# Patient Record
Sex: MALE | HISPANIC OR LATINO | Employment: FULL TIME | URBAN - METROPOLITAN AREA
[De-identification: names, ages, dates, MRNs, and addresses within clinical notes are randomized per-mention and may not be internally consistent; named-entity substitution may affect disease eponyms.]

---

## 2019-04-15 ENCOUNTER — TRANSFERRED RECORDS (OUTPATIENT)
Dept: HEALTH INFORMATION MANAGEMENT | Facility: CLINIC | Age: 29
End: 2019-04-15

## 2019-04-15 LAB
ALT SERPL-CCNC: 33 U/L (ref 0–78)
AST SERPL-CCNC: 19 U/L (ref 0–37)
CREAT SERPL-MCNC: 1.25 MG/DL (ref 0.6–1.3)
GLUCOSE SERPL-MCNC: 90 MG/DL (ref 70–99)
POTASSIUM SERPL-SCNC: 3.8 MMOL/L (ref 3.5–5.1)

## 2019-05-28 ENCOUNTER — NURSE TRIAGE (OUTPATIENT)
Dept: NURSING | Facility: CLINIC | Age: 29
End: 2019-05-28

## 2019-05-29 NOTE — TELEPHONE ENCOUNTER
"Mao calling to make appt with PCP for referral to GI or for colonoscopy.  Since mid April has been having diarrhea, with blood noted at times.  Did have work up previously done since onset of symptoms, including BRAT diet, CT scan, and lab tests, all of which were \"fine\".  Does have 2 small hemmrhoids but reports provider he did see told him they should not account for the amount of blood he is seeing.  Typically having 3-4 stools per day, with blood noted 2-3 times daily.  Blood increases with certain foods including highly fatty foods, greasy foods or carbonated beverages.  With BRAT diet, blood did decrease but did not cease.  Amount of blood varies, sometimes with \"streaks\" and at other times larger amounts with clots.  \"Next step\" per previous provider, was to have a colonoscopy.  Calling to make appt for either GI referral or colonoscopy referral.      Reason for Disposition    [1] Blood in the stool AND [2] small amount of blood   (Exception: only on toilet paper. Reason: diarrhea can cause rectal irritation with blood on wiping)    Additional Information    Negative: Shock suspected (e.g., cold/pale/clammy skin, too weak to stand, low BP, rapid pulse)    Negative: Difficult to awaken or acting confused (e.g., disoriented, slurred speech)    Negative: Sounds like a life-threatening emergency to the triager    Negative: Vomiting also present and worse than the diarrhea    Negative: [1] Blood in stool AND [2] without diarrhea    Negative: Diarrhea in a cancer patient who is currently (or recently) receiving chemotherapy or radiation therapy, or cancer patient who has metastatic or end-stage cancer and is receiving palliative care    Negative: [1] SEVERE abdominal pain (e.g., excruciating) AND [2] present > 1 hour    Negative: [1] SEVERE abdominal pain AND [2] age > 60    Negative: Black or tarry bowel movements  (Exception: chronic-unchanged  black-grey bowel movements AND is taking iron pills or " Pepto-bismol)    Negative: [1] Blood in the stool AND [2] moderate or large amount of blood    Negative: [1] Drinking very little AND [2] dehydration suspected (e.g., no urine > 12 hours, very dry mouth, very lightheaded)    Negative: Patient sounds very sick or weak to the triager    Negative: [1] SEVERE diarrhea (e.g., 7 or more times / day more than normal) AND [2]  age > 60 years    Negative: [1] Constant abdominal pain AND [2] present > 2 hours    Negative: [1] Fever > 103 F (39.4 C) AND [2] not able to get the fever down using Fever Care Advice    Negative: Abdominal pain  (Exception: Pain clears with each passage of diarrhea stool)    Negative: Fever present > 3 days (72 hours)    Negative: Fever > 101 F (38.3 C)    Negative: [1] MODERATE diarrhea (e.g., 4-6 times / day more than normal) AND [2] age > 70 years    Negative: [1] MODERATE diarrhea (e.g., 4-6 times / day more than normal) AND [2] present > 48 hours (2 days)    Negative: [1] SEVERE diarrhea (e.g., 7 or more times / day more than normal) AND [2] present > 24 hours (1 day)    Protocols used: DIARRHEA-A-

## 2019-05-30 ENCOUNTER — OFFICE VISIT (OUTPATIENT)
Dept: FAMILY MEDICINE | Facility: CLINIC | Age: 29
End: 2019-05-30
Payer: COMMERCIAL

## 2019-05-30 ENCOUNTER — TELEPHONE (OUTPATIENT)
Dept: PEDIATRICS | Facility: CLINIC | Age: 29
End: 2019-05-30

## 2019-05-30 VITALS
SYSTOLIC BLOOD PRESSURE: 118 MMHG | BODY MASS INDEX: 28.06 KG/M2 | OXYGEN SATURATION: 97 % | WEIGHT: 196 LBS | HEART RATE: 76 BPM | TEMPERATURE: 98.5 F | HEIGHT: 70 IN | DIASTOLIC BLOOD PRESSURE: 70 MMHG

## 2019-05-30 DIAGNOSIS — Z11.4 SCREENING FOR HIV (HUMAN IMMUNODEFICIENCY VIRUS): ICD-10-CM

## 2019-05-30 DIAGNOSIS — K92.1 HEMATOCHEZIA: Primary | ICD-10-CM

## 2019-05-30 LAB
ANION GAP SERPL CALCULATED.3IONS-SCNC: 7 MMOL/L (ref 3–14)
BASOPHILS # BLD AUTO: 0 10E9/L (ref 0–0.2)
BASOPHILS NFR BLD AUTO: 0.3 %
BUN SERPL-MCNC: 13 MG/DL (ref 7–30)
CALCIUM SERPL-MCNC: 8.8 MG/DL (ref 8.5–10.1)
CHLORIDE SERPL-SCNC: 107 MMOL/L (ref 94–109)
CO2 SERPL-SCNC: 26 MMOL/L (ref 20–32)
CREAT SERPL-MCNC: 0.95 MG/DL (ref 0.66–1.25)
CRP SERPL-MCNC: <2.9 MG/L (ref 0–8)
DIFFERENTIAL METHOD BLD: NORMAL
EOSINOPHIL # BLD AUTO: 0.1 10E9/L (ref 0–0.7)
EOSINOPHIL NFR BLD AUTO: 1.1 %
ERYTHROCYTE [DISTWIDTH] IN BLOOD BY AUTOMATED COUNT: 11.8 % (ref 10–15)
ERYTHROCYTE [SEDIMENTATION RATE] IN BLOOD BY WESTERGREN METHOD: 7 MM/H (ref 0–15)
GFR SERPL CREATININE-BSD FRML MDRD: >90 ML/MIN/{1.73_M2}
GLUCOSE SERPL-MCNC: 74 MG/DL (ref 70–99)
HCT VFR BLD AUTO: 42.8 % (ref 40–53)
HGB BLD-MCNC: 14.3 G/DL (ref 13.3–17.7)
IGA SERPL-MCNC: 217 MG/DL (ref 70–380)
INR PPP: 0.99 (ref 0.86–1.14)
LYMPHOCYTES # BLD AUTO: 2.3 10E9/L (ref 0.8–5.3)
LYMPHOCYTES NFR BLD AUTO: 32.2 %
MCH RBC QN AUTO: 29.7 PG (ref 26.5–33)
MCHC RBC AUTO-ENTMCNC: 33.4 G/DL (ref 31.5–36.5)
MCV RBC AUTO: 89 FL (ref 78–100)
MONOCYTES # BLD AUTO: 0.5 10E9/L (ref 0–1.3)
MONOCYTES NFR BLD AUTO: 7.3 %
NEUTROPHILS # BLD AUTO: 4.3 10E9/L (ref 1.6–8.3)
NEUTROPHILS NFR BLD AUTO: 59.1 %
PLATELET # BLD AUTO: 297 10E9/L (ref 150–450)
POTASSIUM SERPL-SCNC: 3.6 MMOL/L (ref 3.4–5.3)
RBC # BLD AUTO: 4.82 10E12/L (ref 4.4–5.9)
SODIUM SERPL-SCNC: 140 MMOL/L (ref 133–144)
WBC # BLD AUTO: 7.3 10E9/L (ref 4–11)

## 2019-05-30 PROCEDURE — 87329 GIARDIA AG IA: CPT | Performed by: PEDIATRICS

## 2019-05-30 PROCEDURE — 82784 ASSAY IGA/IGD/IGG/IGM EACH: CPT | Performed by: PEDIATRICS

## 2019-05-30 PROCEDURE — 87389 HIV-1 AG W/HIV-1&-2 AB AG IA: CPT | Performed by: PEDIATRICS

## 2019-05-30 PROCEDURE — 83516 IMMUNOASSAY NONANTIBODY: CPT | Mod: 59 | Performed by: PEDIATRICS

## 2019-05-30 PROCEDURE — 85652 RBC SED RATE AUTOMATED: CPT | Performed by: PEDIATRICS

## 2019-05-30 PROCEDURE — 80048 BASIC METABOLIC PNL TOTAL CA: CPT | Performed by: PEDIATRICS

## 2019-05-30 PROCEDURE — 85025 COMPLETE CBC W/AUTO DIFF WBC: CPT | Performed by: PEDIATRICS

## 2019-05-30 PROCEDURE — 99204 OFFICE O/P NEW MOD 45 MIN: CPT | Performed by: PEDIATRICS

## 2019-05-30 PROCEDURE — 87177 OVA AND PARASITES SMEARS: CPT | Performed by: PEDIATRICS

## 2019-05-30 PROCEDURE — 86140 C-REACTIVE PROTEIN: CPT | Performed by: PEDIATRICS

## 2019-05-30 PROCEDURE — 85610 PROTHROMBIN TIME: CPT | Performed by: PEDIATRICS

## 2019-05-30 PROCEDURE — 36415 COLL VENOUS BLD VENIPUNCTURE: CPT | Performed by: PEDIATRICS

## 2019-05-30 PROCEDURE — 87209 SMEAR COMPLEX STAIN: CPT | Performed by: PEDIATRICS

## 2019-05-30 SDOH — HEALTH STABILITY: MENTAL HEALTH: HOW OFTEN DO YOU HAVE 6 OR MORE DRINKS ON ONE OCCASION?: NEVER

## 2019-05-30 SDOH — HEALTH STABILITY: MENTAL HEALTH: HOW MANY STANDARD DRINKS CONTAINING ALCOHOL DO YOU HAVE ON A TYPICAL DAY?: 3 OR 4

## 2019-05-30 SDOH — HEALTH STABILITY: MENTAL HEALTH: HOW OFTEN DO YOU HAVE A DRINK CONTAINING ALCOHOL?: 2-4 TIMES A MONTH

## 2019-05-30 ASSESSMENT — MIFFLIN-ST. JEOR: SCORE: 1856.33

## 2019-05-30 NOTE — TELEPHONE ENCOUNTER
Called pt and would like a call back in one hour. He was at lunch and unable to talk at that time.

## 2019-05-30 NOTE — PROGRESS NOTES
"  SUBJECTIVE:                                                    Mao Patel is a 28 year old male who presents to clinic today for the following health issues:      Diarrhea      Duration: April    Description:       Consistency of stool: watery, runny and loose       Blood in stool: YES       Number of loose stools past 24 hours: 1    Intensity:  moderate    Accompanying signs and symptoms:       Fever: no        Nausea/vomitting: no        Abdominal pain: no        Weight loss: no     History (recent antibiotics or travel/ill contacts/med changes/testing done): [facility]. Pt has reports.    Precipitating or alleviating factors: None    Therapies tried and outcome: None      This is a 27 yo male with 6 weeks of diarrhea, intermittently with blood. He is having 3-4 stools daily with blood approximately 2-3 times. Stools alternate between liquid and more formed. Blood is described as \"streaks\" and other times larger amounts with clots. The blood is mixed in his stool. It is worse with high fat foods, greasy foods or carbonated beverages. It improved with the BRAT diet but did not resolve. He does not have any abdominal pain, but does have urgency. He denies fevers, weight loss, nausea, vomiting. He is otherwise healthy, no personal or family history of autoimmune disease. Of note, the patient did just move from [place] to the  in 1/2019. He did not have any of these symptoms prior to immigrating. He has never give a stool sample. He did take a trip to the [facility] two weeks ago where his symptoms got worse. He did not do any outdoor travel prior to this. 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 No bleeding anywhere else - gums, urine, etc.     The patient was seen and evaluated at [facility] on 4/15/2019. His workup included a CT Abd/pelvis on 4/15 which was unremarkable. Blood tests included. The plan was to try a BRAT diet which did help " "slightly for a couple weeks.      - ESR: 8 (nl 2-10)    - LFTs: alk phos 57, ast 19, alt 33, tbili 0.34, TP 7.7, albumin 4.3    - BMP: normal lytes, cr 1.25    - CBC: wbc 9, hgb 14.4, plt 287    Problem list and histories reviewed & adjusted, as indicated.  Additional history: as documented    There is no problem list on file for this patient.    Past Surgical History:   Procedure Laterality Date     NOSE SURGERY      for deviated septum       Social History     Tobacco Use     Smoking status: Never Smoker     Smokeless tobacco: Never Used   Substance Use Topics     Alcohol use: Yes     Frequency: 2-4 times a month     Drinks per session: 3 or 4     Binge frequency: Never     Family History   Problem Relation Age of Onset     Family History Negative Mother      Other - See Comments Mother         hemorrhoids, required surgery     Other - See Comments Father         cholecystitis     Cancer Maternal Grandfather          No current outpatient medications on file.     No Known Allergies    ROS:  A 10 point ROS is negative other than the symptoms noted above in the HPI.    OBJECTIVE:     /70 (BP Location: Right arm, Patient Position: Chair, Cuff Size: Adult Large)   Pulse 76   Temp 98.5  F (36.9  C) (Oral)   Ht 1.772 m (5' 9.75\")   Wt 88.9 kg (196 lb)   SpO2 97%   BMI 28.33 kg/m    Body mass index is 28.33 kg/m .    Exam:  General: the patient is pleasant, resting comfortably, no acute distress.   HEENT: Normocephalic, atraumatic. TMs and canals are normal. No nasal discharge or deformity. MMM, no oral lesions, age appropriate dentition. No scleral icterus or conjunctivitis, PERRL.   Lungs: Clear to auscultation, no wheezes or crackles.   CV: RRR, nl s1 and s2, no m/r/g.   Abdomen: Soft, nondistended, mild RUQ tenderness to palpation. No rebound or guarding. Bowel sounds active.  Extremities: No lower extremity edema. Peripheral pulses strong and symmetric.   Skin: no appreciable skin lesions/rashes on " exposed skin.   Neuro: Alert and oriented x4, grossly normal neurologic exam.     Diagnostic Test Results:  ESR: 7  CBC: wbc 7.3, hgb 14.3, plt 297    ASSESSMENT/PLAN:     1. Chronic bloody diarrhea   28 yoM recently immigrated from [place] in 1/2019 now presenting with 6-8 weeks of blood diarrhea, 3-4 episodes daily without weight loss, abdominal pain or drop in hemoglobin. Differential includes infectious etiology vs inflammatory bowel disease vs other colitis. CT imaging negative, inflammatory markers negative. Does have hemorrhoids, but blood mixed in with stool makes this seem less likely. Highest concern is some infectious etiology. The patient is completely hemodynamically stable and hemoglobin is within normal limits. I did call and discuss the case with oncall gastroenterologist at [facility], Dr. Mitchell. Will proceed with lab workup as planned and work on getting the patient in for colonoscopy ASAP. Based on results of colonoscopy, may also need formal GI consult.     - CBC with platelets and differential    - CRP, inflammation    - INR    - IgA    - Tissue transglutaminase chelsea IgA and IgG    - Basic metabolic panel  (Ca, Cl, CO2, Creat, Gluc, K, Na, BUN)    - Ova and Parasite Exam Routine; Future    - Giardia antigen; Future    - HIV Antigen Antibody Combo    - ESR: Erythrocyte sedimentation rate    - Enteric Bacteria and Virus Panel by [facility] Stool; Future    - GASTROENTEROLOGY ADULT REF PROCEDURE ONLY    2. Screening for HIV (human immunodeficiency virus)  No risk factors, but has never had this in the past and if immunocompromised would have a different differential for bloody diarrhea.    - HIV Antigen Antibody Combo    Follow up: after colonoscopy, will try to get the patient into resident clinic in the next 2-3 weeks.       Lesli Rodriguez MD  Internal Medicine Pediatrics  [facility]

## 2019-05-30 NOTE — TELEPHONE ENCOUNTER
Please call the patient to schedule a follow up appointment for mid-June.  He would be an excellent patient to see one of the residents at [facility] if he is agreeable to getting care there.

## 2019-05-30 NOTE — PATIENT INSTRUCTIONS
Thank you for coming in to clinic today. It was a pleasure to meet you. I am currently building my patient panel and would be happy to see you back in clinic. I currently see patients on Wednesday afternoons and Thursday mornings.     Lesli Rodriguez MD  Internal Medicine-Pediatrics   For appointments: [phone]      Doctor's Instructions:   For the blood in your stool:     - we will check blood tests today including blood counts, electrolytes, for celiac disease, inflammatory markers, HIV    - I will send you home with a stool collection kit to look for parasites.    - a referral has been made for colonoscopy    Return in 3-4 weeks for recheck, sooner if symptoms are getting worse.

## 2019-05-31 DIAGNOSIS — K92.1 HEMATOCHEZIA: ICD-10-CM

## 2019-05-31 LAB
G LAMBLIA AG STL QL IA: NORMAL
HIV 1+2 AB+HIV1 P24 AG SERPL QL IA: NONREACTIVE
O+P STL MICRO: NORMAL
SPECIMEN SOURCE: NORMAL
SPECIMEN SOURCE: NORMAL
TTG IGA SER-ACNC: 1 U/ML
TTG IGG SER-ACNC: <1 U/ML

## 2019-05-31 PROCEDURE — 87506 IADNA-DNA/RNA PROBE TQ 6-11: CPT | Performed by: PEDIATRICS

## 2019-05-31 NOTE — TELEPHONE ENCOUNTER
Dr Rodriguez  I called pt and he is wondring since he has dropped off specimens yesterday and he will drop off again today. He has an appt for Colonoscopy on June 14th. Do you want to see him right before the procedure. You are going to have some open days at [place] on the 12th and 13th. Would you want to schedule an appt with him during one on those days?  Let me know and I will call him back.

## 2019-06-04 NOTE — TELEPHONE ENCOUNTER
I'd like for him to see one of the residents following his colonoscopy. My last days in clinic are next week.     Thanks!   Lesli

## 2019-06-12 NOTE — PROGRESS NOTES
"Subjective     Mao Patel is a 29 year old male who presents to clinic today for the following health issues:    HPI   Patient is here to follow up from colonoscopy procedure.   He had this scheduled after an extended period of diarrhea and bleeding  Imaging did show proctitis  There is no family hx  Since his last appt he has actually felt well save for the BM changes  He has not picked up Canasa 2/2 price    Patient Active Problem List   Diagnosis     Ulcerative proctitis (H)     Past Surgical History:   Procedure Laterality Date     COLONOSCOPY N/A 6/14/2019    Procedure: COLONOSCOPY, WITH cold biopsy;  Surgeon: Phill Ruiz MD;  Location:  GI     NOSE SURGERY      for deviated septum     ORTHOPEDIC SURGERY         Social History     Tobacco Use     Smoking status: Never Smoker     Smokeless tobacco: Never Used   Substance Use Topics     Alcohol use: Yes     Frequency: 2-4 times a month     Drinks per session: 3 or 4     Binge frequency: Never     Family History   Problem Relation Age of Onset     Family History Negative Mother      Other - See Comments Mother         hemorrhoids, required surgery     Other - See Comments Father         cholecystitis     Cancer Maternal Grandfather      Colon Cancer No family hx of            Reviewed and updated as needed this visit by Provider         Review of Systems   ROS COMP: Constitutional, HEENT, cardiovascular, pulmonary, gi and gu systems are negative, except as otherwise noted.      Objective    /68   Pulse 61   Temp 97.1  F (36.2  C) (Tympanic)   Resp 16   Ht 1.772 m (5' 9.75\")   Wt 90.9 kg (200 lb 8 oz)   SpO2 99%   BMI 28.98 kg/m    Body mass index is 28.98 kg/m .  Physical Exam       Diagnostic Test Results:  Labs and imaging reviewed in Epic        Assessment & Plan     1. Ulcerative proctitis with rectal bleeding (H)  This is a new diagnosis for Mao. We reviewed it in clinic today extensively. Questions answered at length. He is " comfortable with his dx. He has not yet started his medication 2/2 cost so I will send a generic Rx to see if this helps. He needs to schedule with [facility] for further, routine care of his colitis. He'll follow up as needed prior to this with any change in symptoms. Referral has been placed for specialty  - mesalamine (CANASA) 1000 MG suppository; Place 1 suppository (1,000 mg) rectally At Bedtime  Dispense: 30 suppository; Refill: 0  - GASTROENTEROLOGY ADULT REF CONSULT ONLY      No follow-ups on file.    Jeremiah Esparza PA-C  [facility]

## 2019-06-14 ENCOUNTER — HOSPITAL ENCOUNTER (OUTPATIENT)
Facility: CLINIC | Age: 29
Discharge: HOME | End: 2019-06-14
Attending: INTERNAL MEDICINE | Admitting: INTERNAL MEDICINE
Payer: COMMERCIAL

## 2019-06-14 VITALS
RESPIRATION RATE: 20 BRPM | OXYGEN SATURATION: 100 % | HEART RATE: 68 BPM | DIASTOLIC BLOOD PRESSURE: 81 MMHG | SYSTOLIC BLOOD PRESSURE: 117 MMHG

## 2019-06-14 LAB — COLONOSCOPY: NORMAL

## 2019-06-14 PROCEDURE — G0500 MOD SEDAT ENDO SERVICE >5YRS: HCPCS | Performed by: INTERNAL MEDICINE

## 2019-06-14 PROCEDURE — 88305 TISSUE EXAM BY PATHOLOGIST: CPT | Mod: 26 | Performed by: INTERNAL MEDICINE

## 2019-06-14 PROCEDURE — 45380 COLONOSCOPY AND BIOPSY: CPT | Performed by: INTERNAL MEDICINE

## 2019-06-14 PROCEDURE — 88305 TISSUE EXAM BY PATHOLOGIST: CPT | Performed by: INTERNAL MEDICINE

## 2019-06-14 PROCEDURE — 88342 IMHCHEM/IMCYTCHM 1ST ANTB: CPT | Performed by: INTERNAL MEDICINE

## 2019-06-14 PROCEDURE — 88342 IMHCHEM/IMCYTCHM 1ST ANTB: CPT | Mod: 26 | Performed by: INTERNAL MEDICINE

## 2019-06-14 PROCEDURE — 25000128 H RX IP 250 OP 636: Performed by: INTERNAL MEDICINE

## 2019-06-14 RX ORDER — ONDANSETRON 4 MG/1
4 TABLET, ORALLY DISINTEGRATING ORAL EVERY 6 HOURS PRN
Status: DISCONTINUED | OUTPATIENT
Start: 2019-06-14 | End: 2019-06-14 | Stop reason: HOSPADM

## 2019-06-14 RX ORDER — LIDOCAINE 40 MG/G
CREAM TOPICAL
Status: DISCONTINUED | OUTPATIENT
Start: 2019-06-14 | End: 2019-06-14 | Stop reason: HOSPADM

## 2019-06-14 RX ORDER — NALOXONE HYDROCHLORIDE 0.4 MG/ML
.1-.4 INJECTION, SOLUTION INTRAMUSCULAR; INTRAVENOUS; SUBCUTANEOUS
Status: DISCONTINUED | OUTPATIENT
Start: 2019-06-14 | End: 2019-06-14 | Stop reason: HOSPADM

## 2019-06-14 RX ORDER — ONDANSETRON 2 MG/ML
4 INJECTION INTRAMUSCULAR; INTRAVENOUS EVERY 6 HOURS PRN
Status: DISCONTINUED | OUTPATIENT
Start: 2019-06-14 | End: 2019-06-14 | Stop reason: HOSPADM

## 2019-06-14 RX ORDER — FLUMAZENIL 0.1 MG/ML
0.2 INJECTION, SOLUTION INTRAVENOUS
Status: DISCONTINUED | OUTPATIENT
Start: 2019-06-14 | End: 2019-06-14 | Stop reason: HOSPADM

## 2019-06-14 RX ORDER — ONDANSETRON 2 MG/ML
4 INJECTION INTRAMUSCULAR; INTRAVENOUS
Status: DISCONTINUED | OUTPATIENT
Start: 2019-06-14 | End: 2019-06-14 | Stop reason: HOSPADM

## 2019-06-14 RX ORDER — FENTANYL CITRATE 50 UG/ML
INJECTION, SOLUTION INTRAMUSCULAR; INTRAVENOUS PRN
Status: DISCONTINUED | OUTPATIENT
Start: 2019-06-14 | End: 2019-06-14 | Stop reason: HOSPADM

## 2019-06-14 NOTE — H&P
Pre-Endoscopy History and Physical     Mao Patel MRN# [id number]   YOB: 1990 Age: 29 year old     Date of Procedure: 6/14/2019  Primary care provider: Elieser Javier  Type of Endoscopy: Colonoscopy with possible biopsy, possible polypectomy  Reason for Procedure: bleed  Type of Anesthesia Anticipated: Conscious Sedation    HPI:    Mao is a 29 year old male who will be undergoing the above procedure.      A history and physical has been performed. The patient's medications and allergies have been reviewed. The risks and benefits of the procedure and the sedation options and risks were discussed with the patient.  All questions were answered and informed consent was obtained.      He denies a personal or family history of anesthesia complications or bleeding disorders.     There is no problem list on file for this patient.       Past Medical History:   Diagnosis Date     NO ACTIVE PROBLEMS         Past Surgical History:   Procedure Laterality Date     NOSE SURGERY      for deviated septum     ORTHOPEDIC SURGERY         Social History     Tobacco Use     Smoking status: Never Smoker     Smokeless tobacco: Never Used   Substance Use Topics     Alcohol use: Yes     Frequency: 2-4 times a month     Drinks per session: 3 or 4     Binge frequency: Never       Family History   Problem Relation Age of Onset     Family History Negative Mother      Other - See Comments Mother         hemorrhoids, required surgery     Other - See Comments Father         cholecystitis     Cancer Maternal Grandfather      Colon Cancer No family hx of        Prior to Admission medications    Not on File       No Known Allergies     REVIEW OF SYSTEMS:   5 point ROS negative except as noted above in HPI, including Gen., Resp., CV, GI &  system review.    PHYSICAL EXAM:   There were no vitals taken for this visit. Estimated body mass index is 28.33 kg/m  as calculated from the following:    Height as of 5/30/19:  "1.772 m (5' 9.75\").    Weight as of 5/30/19: 88.9 kg (196 lb).   GENERAL APPEARANCE: alert, and oriented  MENTAL STATUS: alert  AIRWAY EXAM: Mallampatti Class I (visualization of the soft palate, fauces, uvula, anterior and posterior pillars)  RESP: lungs clear to auscultation - no rales, rhonchi or wheezes  CV: regular rates and rhythm  DIAGNOSTICS:    Not indicated    IMPRESSION   ASA Class 1 - Healthy patient, no medical problems    PLAN:   Plan for Colonoscopy with possible biopsy, possible polypectomy. We discussed the risks, benefits and alternatives and the patient wished to proceed.    The above has been forwarded to the consulting provider.      Signed Electronically by: Phill Ruiz  June 14, 2019          "

## 2019-06-14 NOTE — LETTER
May 31, 2019      Mao Patel  [address]   [facility] [address]        Thank you for choosing [facility]. You are scheduled for the following service:     Date:  Friday June 14, 2019             Procedure:  COLONOSCOPY  Doctor:        Dr Ruiz   Arrival Time:  1pm  *Check in at Emergency/Endoscopy desk*  Procedure Time:  130pm      Location:   [facility]        Endoscopy Department, First Floor (Enter through ER Doors) *        [address] [place], [place] [address]      [phone] or [phone] () to reschedule      MIRALAX -GATORADE  PREP  Colonoscopy is the most accurate test to detect colon polyps and colon cancer; and the only test where polyps can be removed. During this procedure, a doctor examines the lining of your large intestine and rectum through a flexible tube.   Transportation  Arrange for a ride for the day of your procedure with a responsible adult.  A taxi ride is not an option unless you are accompanied by a responsible adult. If you fail to arrange transportation with a responsible adult, your procedure will be cancelled and rescheduled.    Purchase the  following supplies at your local pharmacy:  - 2 (two) bisacodyl tablets: each tablet contains 5 mg.  (Dulcolax  laxative NOT Dulcolax  stool softener)   - 1 (one) 8.3 oz bottle of Polyethylene Glycol (PEG) 3350 Powder   (MiraLAX , Smooth LAX , ClearLAX  or equivalent)  - 64 oz Gatorade    Regular Gatorade, Gatorade G2 , Powerade , Powerade Zero  or Pedialyte  is acceptable. Red colored flavors are not allowed; all other colors (yellow, green, orange, purple and blue) are okay. It is also okay to buy two 2.12 oz packets of powdered Gatorade that can be mixed with water to a total volume of 64 oz of liquid.  - 1 (one) 10 oz bottle of Magnesium Citrate (Red colored flavors are not allowed)  It is also okay for you to use a 0.5 oz package of powdered magnesium  citrate (17 g) mixed with 10 oz of water.      PREPARATION FOR COLONOSCOPY    7 days before:    Discontinue fiber supplements and medications containing iron. This includes Metamucil  and Fibercon ; and multivitamins with iron.    3 days before:    Begin a low-fiber diet. A low-fiber diet helps making the cleanout more effective.     Examples of a low-fiber diet include (but are not limited to): white bread, white rice, pasta, crackers, fish, chicken, eggs, ground beef, creamy peanut butter, cooked/steamed/boiled vegetables, canned fruit, bananas, melons, milk, plain yogurt cheese, salad dressing and other condiments.     The following are not allowed on a low-fiber diet: seeds, nuts, popcorn, bran, whole wheat, corn, quinoa, raw fruits and vegetables, berries and dried fruit, beans and lentils.    For additional details on low-fiber diet, please refer to the table on the last page.    2 days before:    Continue the low-fiber diet.     Drink at least 8 glasses of water throughout the day.     Stop eating solid foods at 11:45 pm.    1 day before:    In the morning: begin a clear liquid diet (liquids you can see through).     Examples of a clear liquid diet include: water, clear broth or bouillon, Gatorade, Pedialyte or Powerade, carbonated and non-carbonated soft drinks (Sprite , 7-Up , ginger ale), strained fruit juices without pulp (apple, white grape, white cranberry), Jell-O  and popsicles.     The following are not allowed on a clear liquid diet: red liquids, alcoholic beverages, dairy products (milk, creamer, and yogurt), protein shakes, creamy broths, juice with pulp and chewing tobacco.    At noon: take 2 (two) bisacodyl tablets     At 4 (and no later than 6pm): start drinking the Miralax-Gatorade preparation (8.3 oz of Miralax mixed with 64 oz of Gatorade in a large pitcher). Drink 1(one) 8 oz glass every 15 minutes thereafter, until the mixture is gone.    COLON CLEANSING TIPS: drink adequate amounts of  fluids before and after your colon cleansing to prevent dehydration. Stay near a toilet because you will have diarrhea. Even if you are sitting on the toilet, continue to drink the cleansing solution every 15 minutes. If you feel nauseous or vomit, rinse your mouth with water, take a 15 to 30-minute-break and then continue drinking the solution. You will be uncomfortable until the stool has flushed from your colon (in about 2 to 4 hours). You may feel chilled.    Day of your procedure  You may take all of your morning medications including blood pressure medications, blood thinners (if you have not been instructed to stop these by our office), methadone, anti-seizure medications with sips of water 3 hours prior to your procedure or earlier. Do not take insulin or vitamins prior to your procedure. Continue the clear liquid diet.       4 hours prior: drink 10 oz of magnesium citrate. It may be easier to drink it with a straw.    STOP consuming all liquids after that.     Do not take anything by mouth during this time.     Allow extra time to travel to your procedure as you may need to stop and use a restroom along the way.    You are ready for the procedure, if you followed all instructions and your stool is no longer formed, but clear or yellow liquid. If you are unsure whether your colon is clean, please call our office at [phone] before you leave for your appointment.    Bring the following to your procedure:  - Insurance Card/Photo ID.   - List of current medications including over-the-counter medications and supplements.   - Your rescue inhaler if you currently use one to control asthma.      Canceling or rescheduling your appointment:   If you must cancel or reschedule your appointment, please call [phone] as soon as possible.      COLONOSCOPY PRE-PROCEDURE CHECKLIST    If you have diabetes, ask your regular doctor for diet and medication restrictions.  If you take an anticoagulant or anti-platelet  medication (such as Coumadin , Lovenox , Pradaxa , Xarelto , Eliquis , etc.), please call your primary doctor for advice on holding this medication.  If you take aspirin you may continue to do so.  If you are or may be pregnant, please discuss the risks and benefits of this procedure with your doctor.        What happens during a colonoscopy?    Plan to spend up to two hours, starting at registration time, at the endoscopy center the day of your procedure. The colonoscopy takes an average of 15 to 30 minutes. Recovery time is about 30 minutes.      Before the exam:    You will change into a gown.    Your medical history and medication list will be reviewed with you, unless that has been done over the phone prior to the procedure.     A nurse will insert an intravenous (IV) line into your hand or arm.    The doctor will meet with you and will give you a consent form to sign.  During the exam:     Medicine will be given through the IV line to help you relax.     Your heart rate and oxygen levels will be monitored. If your blood pressure is low, you may be given fluids through the IV line.     The doctor will insert a flexible hollow tube, called a colonoscope, into your rectum. The scope will be advanced slowly through the large intestine (colon).    You may have a feeling of fullness or pressure.     If an abnormal tissue or a polyp is found, the doctor may remove it through the endoscope for closer examination, or biopsy. Tissue removal is painless    After the exam:           Any tissue samples removed during the exam will be sent to a lab for evaluation. It may take 5-7 working days for you to be notified of the results.     A nurse will provide you with complete discharge instructions before you leave the endoscopy center. Be sure to ask the nurse for specific instructions if you take blood thinners such as Aspirin, Coumadin or Plavix.     The doctor will prepare a full report for you and for the physician who  referred you for the procedure.     Your doctor will talk with you about the initial results of your exam.      Medication given during the exam will prohibit you from driving for the rest of the day.     Following the exam, you may resume your normal diet. Your first meal should be light, no greasy foods. Avoid alcohol until the next day.     You may resume your regular activities the day after the procedure.         LOW-FIBER DIET    Foods RECOMMENDED Foods to AVOID   Breads, Cereal, Rice and Pasta:   White bread, rolls, biscuits, croissant and theodora toast.   Waffles, Russian toast and pancakes.   White rice, noodles, pasta, macaroni and peeled cooked potatoes.   Plain crackers and saltines.   Cooked cereals: farina, cream of rice.   Cold cereals: Puffed Rice , Rice Krispies , Corn Flakes  and Special K    Breads, Cereal, Rice and Pasta:   Breads or rolls with nuts, seeds or fruit.   Whole wheat, pumpernickel, rye breads and cornbread.   Potatoes with skin, brown or wild rice, and kasha (buckwheat).     Vegetables:   Tender cooked and canned vegetables without seeds: carrots, asparagus tips, green or wax beans, pumpkin, spinach, lima beans. Vegetables:   Raw or steamed vegetables.   Vegetables with seeds.   Sauerkraut.   Winter squash, peas, broccoli, Brussel sprouts, cabbage, onions, cauliflower, baked beans, peas and corn.   Fruits:   Strained fruit juice.   Canned fruit, except pineapple.   Ripe bananas and melon. Fruits:   Prunes and prune juice.   Raw fruits.   Dried fruits: figs, dates and raisins.   Milk/Dairy:   Milk: plain or flavored.   Yogurt, custard and ice cream.   Cheese and cottage cheese Milk/Dairy:     Meat and other proteins:   ground, well-cooked tender beef, lamb, ham, veal, pork, fish, poultry and organ meats.   Eggs.   Peanut butter without nuts. Meat and other proteins:   Tough, fibrous meats with gristle.   Dry beans, peas and lentils.   Peanut butter with nuts.   Tofu.   Fats, Snack,  Sweets, Condiments and Beverages:   Margarine, butter, oils, mayonnaise, sour cream and salad dressing, plain gravy.   Sugar, hard candy, clear jelly, honey and syrup.   Spices, cooked herbs, bouillon, broth and soups made with allowed vegetable, ketchup and mustard.   Coffee, tea and carbonated drinks.   Plain cakes, cookies and pretzels.   Gelatin, plain puddings, custard, ice cream, sherbet and popsicles. Fats, Snack, Sweets, Condiments and Beverages:   Nuts, seeds and coconut.   Jam, marmalade and preserves.   Pickles, olives, relish and horseradish.   All desserts containing nuts, seeds, dried fruit and coconut; or made from whole grains or bran.   Candy made with nuts or seeds.   Popcorn.                     DIRECTIONS TO THE ENDOSCOPY DEPARTMENT     From the north ([facility])  Take [address] South, exit on [address] [address]. Get into the left hand anais, turn left (east), go one-half mile to [address] and turn left. Go north to the first stoplight, take a right on [address] and follow it to the Emergency entrance.    From the south ([facility])  Take [address]N to the [address]E split and exit on [address] [address]. On [address] [address], turn left (west) to [address]. Turn right (north) on [address]. Go north to the first stoplight, take a right on [address] and follow it to the Emergency entrance.    From the east via 35E ([facility])  Take [address] south to [address] [address] exit. Turn right on [address] [address]. Go west to [address]. Turn right (north) on [address]. Go to the first stoplight, take a right and follow on [address] to the Emergency entrance.    From the east via [address] ([facility])  Take [address] to [address]. Turn left (south) on [address] to [address]. Turn left (east) on [address] and follow it to the Emergency entrance.    From the west via [address] ([place], [place])  Take [address] east to [address]. Turn right (south) on [address] to [facility]. Turn left (east) on [facility] and follow it to the Emergency entrance.

## 2019-06-17 ENCOUNTER — OFFICE VISIT (OUTPATIENT)
Dept: FAMILY MEDICINE | Facility: CLINIC | Age: 29
End: 2019-06-17
Payer: COMMERCIAL

## 2019-06-17 VITALS
TEMPERATURE: 97.1 F | BODY MASS INDEX: 28.71 KG/M2 | SYSTOLIC BLOOD PRESSURE: 120 MMHG | DIASTOLIC BLOOD PRESSURE: 68 MMHG | WEIGHT: 200.5 LBS | RESPIRATION RATE: 16 BRPM | HEART RATE: 61 BPM | HEIGHT: 70 IN | OXYGEN SATURATION: 99 %

## 2019-06-17 DIAGNOSIS — K51.211 ULCERATIVE PROCTITIS WITH RECTAL BLEEDING (H): ICD-10-CM

## 2019-06-17 PROBLEM — K51.20 ULCERATIVE PROCTITIS (H): Status: ACTIVE | Noted: 2019-06-17

## 2019-06-17 PROCEDURE — 99213 OFFICE O/P EST LOW 20 MIN: CPT | Performed by: PHYSICIAN ASSISTANT

## 2019-06-17 RX ORDER — MESALAMINE 1000 MG/1
1000 SUPPOSITORY RECTAL AT BEDTIME
Qty: 30 SUPPOSITORY | Refills: 0 | Status: SHIPPED | OUTPATIENT
Start: 2019-06-17 | End: 2019-07-24

## 2019-06-17 ASSESSMENT — MIFFLIN-ST. JEOR: SCORE: 1871.74

## 2019-06-17 NOTE — PATIENT INSTRUCTIONS
Patient Education     What is Ulcerative Colitis?    Ulcerative colitis is a long-term (chronic) disease. It causes swelling (inflammation) and sores (ulcers) in the inner lining of the rectum and colon. It is a form of inflammatory bowel disease (IBD). No one knows for sure what causes ulcerative colitis, but symptoms can be treated. People with ulcerative colitis can lead full, active lives.  Symptoms of ulcerative colitis  Symptoms often have to do with bowel movements. Symptoms include:    Frequent, loose bowel movements    Blood and pus in stools, or rectal bleeding    Feeling that you didn t fully empty your bowels (incomplete bowel movement)    Feeling that you need to have a bowel movement right away (urgency)    Belly (abdominal) cramps    Loss of appetite, weight loss    Feeling very tired (fatigue)    Joint pain    Rectal pain that comes and goes  Your treatment options  Your healthcare provider will take a full health history and family history. He or she will also give you a physical exam. Your provider may also order certain tests. These may include:    Lab tests. Your blood and stool will be checked.    Endoscopies of the large intestine. These tests are the most accurate way to diagnose this condition. They use a long, flexible tube with a tiny light and camera on one end. They check the inside of your large intestine.  Medicines  . Your provider will try to find the medicines that work best for you. These may include:    A type of anti-inflammatory medicine (called 5-ASA compounds or mesalamine) to help reduce intestinal swelling and inflammation    Corticosteroids to help reduce inflammation    Antibiotics to fight bacteria, if there is an infection    Medicines to control your body s immune system (such as immunomodulators or biologics)  Lifestyle changes         Ulcerative colitis affects the inside layers of the rectum and colon.     Certain foods can make your symptoms worse. You may need to  change what you eat. Avoid any food that makes your symptoms worse. These foods vary from person to person. But certain foods cause symptoms in many people. These include high-fiber foods (such as fresh vegetables) and high-fat foods (such as dairy products and red meat). Keep track of foods that cause you problems.    Stress can also worsen symptoms. Reducing stress may help. Methods like relaxation exercises, meditation, and deep breathing can help you control stress. Your healthcare provider may be able to tell you more about these.  If surgery is needed  Surgery may help control or even cure ulcerative colitis. It is done to remove a severely affected part of the colon. If this is an option for you, your provider can give you more information.  Date Last Reviewed: 7/1/2016 2000-2018 [email or web address]. [address] [place] [address], [place] [address]. All rights reserved. This information is not intended as a substitute for professional medical care. Always follow your healthcare professional's instructions.

## 2019-06-18 LAB — COPATH REPORT: NORMAL

## 2019-07-24 ENCOUNTER — OFFICE VISIT (OUTPATIENT)
Dept: FAMILY MEDICINE | Facility: CLINIC | Age: 29
End: 2019-07-24
Payer: COMMERCIAL

## 2019-07-24 ENCOUNTER — TELEPHONE (OUTPATIENT)
Dept: OTHER | Facility: CLINIC | Age: 29
End: 2019-07-24

## 2019-07-24 VITALS
SYSTOLIC BLOOD PRESSURE: 122 MMHG | OXYGEN SATURATION: 97 % | HEIGHT: 70 IN | BODY MASS INDEX: 28.2 KG/M2 | HEART RATE: 88 BPM | DIASTOLIC BLOOD PRESSURE: 64 MMHG | RESPIRATION RATE: 16 BRPM | WEIGHT: 197 LBS | TEMPERATURE: 98.2 F

## 2019-07-24 DIAGNOSIS — I83.93 VARICOSE VEINS OF BOTH LOWER EXTREMITIES, UNSPECIFIED WHETHER COMPLICATED: Primary | ICD-10-CM

## 2019-07-24 DIAGNOSIS — J34.2 DEVIATED NASAL SEPTUM: ICD-10-CM

## 2019-07-24 DIAGNOSIS — R09.81 NASAL CONGESTION: ICD-10-CM

## 2019-07-24 DIAGNOSIS — J02.9 ACUTE PHARYNGITIS, UNSPECIFIED ETIOLOGY: ICD-10-CM

## 2019-07-24 LAB
DEPRECATED S PYO AG THROAT QL EIA: NORMAL
SPECIMEN SOURCE: NORMAL

## 2019-07-24 PROCEDURE — 99214 OFFICE O/P EST MOD 30 MIN: CPT | Performed by: PHYSICIAN ASSISTANT

## 2019-07-24 PROCEDURE — 87880 STREP A ASSAY W/OPTIC: CPT | Performed by: PHYSICIAN ASSISTANT

## 2019-07-24 PROCEDURE — 87081 CULTURE SCREEN ONLY: CPT | Performed by: PHYSICIAN ASSISTANT

## 2019-07-24 RX ORDER — MOMETASONE FUROATE MONOHYDRATE 50 UG/1
2 SPRAY, METERED NASAL DAILY
Qty: 17 G | Refills: 1 | Status: SHIPPED | OUTPATIENT
Start: 2019-07-24 | End: 2019-09-13

## 2019-07-24 ASSESSMENT — MIFFLIN-ST. JEOR: SCORE: 1855.87

## 2019-07-24 NOTE — TELEPHONE ENCOUNTER
"Referral received via [employer] \"Work Que\", per  guidelines referral forwarded to [facility].     Aparna Hickey BSN, RN      "

## 2019-07-25 LAB
BACTERIA SPEC CULT: NORMAL
SPECIMEN SOURCE: NORMAL

## 2019-07-25 NOTE — TELEPHONE ENCOUNTER
Rec'vd referral from pcp that pt needs to be seen at [facility] for varicose veins. I clld pt unable to leave message. Voicemail box full.

## 2019-07-29 NOTE — TELEPHONE ENCOUNTER
Abdield pt to schedule appt with [facility], No answer VM box full. Will mail forms to pt so they can schedule

## 2019-08-05 ENCOUNTER — OFFICE VISIT (OUTPATIENT)
Dept: VASCULAR SURGERY | Facility: CLINIC | Age: 29
End: 2019-08-05
Payer: COMMERCIAL

## 2019-08-05 DIAGNOSIS — Z53.9 ERRONEOUS ENCOUNTER--DISREGARD: Primary | ICD-10-CM

## 2019-08-05 DIAGNOSIS — I83.93 VARICOSE VEINS OF BOTH LOWER EXTREMITIES, UNSPECIFIED WHETHER COMPLICATED: ICD-10-CM

## 2019-08-05 PROCEDURE — 99207 ZZC VEINSOLUTIONS FREE SCREENING: CPT | Performed by: SURGERY

## 2019-08-13 NOTE — PATIENT INSTRUCTIONS
Preventive Health Recommendations  Male Ages 26 - 39    Yearly exam:             See your health care provider every year in order to  o   Review health changes.   o   Discuss preventive care.    o   Review your medicines if your doctor has prescribed any.    You should be tested each year for STDs (sexually transmitted diseases), if you re at risk.     After age 35, talk to your provider about cholesterol testing. If you are at risk for heart disease, have your cholesterol tested at least every 5 years.     If you are at risk for diabetes, you should have a diabetes test (fasting glucose).  Shots: Get a flu shot each year. Get a tetanus shot every 10 years.     Nutrition:    Eat at least 5 servings of fruits and vegetables daily.     Eat whole-grain bread, whole-wheat pasta and brown rice instead of white grains and rice.     Get adequate Calcium and Vitamin D.     Lifestyle    Exercise for at least 150 minutes a week (30 minutes a day, 5 days a week). This will help you control your weight and prevent disease.     Limit alcohol to one drink per day.     No smoking.     Wear sunscreen to prevent skin cancer.     See your dentist every six months for an exam and cleaning.     Patient Education     Hip Rotation (Flexibility)    These instructions are for the right hip. Switch sides for your left hip.  1. Lie on your back on the floor, with your knees bent and feet flat on the floor. Don t press your neck or lower back to the floor.  2. Rest your right ankle on your left knee.  3. Place a towel around the back of your left thigh. Pull on the ends of the towel to pull your left knee toward your chest. Feel the stretch in your buttocks.  4. Hold for 30 to 60 seconds. Lower your leg back down.  5. Repeat 2 times, or as instructed.  6. Switch legs and repeat.   7. Do this 3 times a day, or as instructed.  Date Last Reviewed: 3/10/2016    5575-1012 [employer]. [address] [place] [address], [place] [address]. All  rights reserved. This information is not intended as a substitute for professional medical care. Always follow your healthcare professional's instructions.

## 2019-08-13 NOTE — PROGRESS NOTES
SUBJECTIVE:   CC: Mao Patel is an 29 year old male who presents for preventative health visit.     Healthy Habits:     Getting at least 3 servings of Calcium per day:  Yes    Bi-annual eye exam:  Yes    Dental care twice a year:  Yes    Sleep apnea or symptoms of sleep apnea:  Excessive snoring    Diet:  Regular (no restrictions), Low fat/cholesterol and Carbohydrate counting    Frequency of exercise:  4-5 days/week    Duration of exercise:  45-60 minutes    Taking medications regularly:  Yes    Barriers to taking medications:  None    Medication side effects:  Not applicable    PHQ-2 Total Score: 0    Additional concerns today:  Yes    Today's PHQ-2 Score:   PHQ-2 ( 1999 [employer]) 8/21/2019   Q1: Little interest or pleasure in doing things 0   Q2: Feeling down, depressed or hopeless 0   PHQ-2 Score 0   Q1: Little interest or pleasure in doing things Not at all   Q2: Feeling down, depressed or hopeless Not at all   PHQ-2 Score 0     Abuse: Current or Past(Physical, Sexual or Emotional)- No  Do you feel safe in your environment? Yes    Social History     Tobacco Use     Smoking status: Never Smoker     Smokeless tobacco: Never Used   Substance Use Topics     Alcohol use: Yes     Frequency: 2-4 times a month     Drinks per session: 3 or 4     Binge frequency: Never     Comment: Once a week during weekend.       Alcohol Use 8/21/2019   Prescreen: >3 drinks/day or >7 drinks/week? No       Last PSA: No results found for: PSA    Reviewed orders with patient. Reviewed health maintenance and updated orders accordingly - Yes  Patient Active Problem List   Diagnosis     Ulcerative proctitis (H)     Varicose veins of both lower extremities, unspecified whether complicated     Past Surgical History:   Procedure Laterality Date     COLONOSCOPY N/A 6/14/2019    Procedure: COLONOSCOPY, WITH cold biopsy;  Surgeon: Phill Ruiz MD;  Location:  GI     NOSE SURGERY      for deviated septum     ORTHOPEDIC SURGERY          Social History     Tobacco Use     Smoking status: Never Smoker     Smokeless tobacco: Never Used   Substance Use Topics     Alcohol use: Yes     Frequency: 2-4 times a month     Drinks per session: 3 or 4     Binge frequency: Never     Comment: Once a week during weekend.     Family History   Problem Relation Age of Onset     No Known Problems Mother      Other - See Comments Father         cholecystitis     Cancer Maternal Grandfather      Prostate Cancer Maternal Grandfather      No Known Problems Maternal Grandmother      No Known Problems Paternal Grandmother      No Known Problems Paternal Grandfather      No Known Problems Brother      No Known Problems Sister      Colon Cancer No family hx of          Current Outpatient Medications   Medication Sig Dispense Refill     mometasone (NASONEX) 50 MCG/ACT nasal spray Spray 2 sprays into both nostrils daily 17 g 1     order for DME Equipment being ordered: compression stockings 10-20mmHg 1 Units 0     Allergies   Allergen Reactions     Seasonal Allergies        Reviewed and updated as needed this visit by clinical staff  Tobacco  Allergies  Meds  Problems  Med Hx  Surg Hx  Fam Hx  Soc Hx          Reviewed and updated as needed this visit by Provider  Meds  Problems            Review of Systems   Constitutional: Negative for chills and fever.   HENT: Negative for congestion, ear pain, hearing loss and sore throat.    Eyes: Negative for pain and visual disturbance.   Respiratory: Negative for cough and shortness of breath.    Cardiovascular: Negative for chest pain, palpitations and peripheral edema.   Gastrointestinal: Negative for abdominal pain, constipation, diarrhea, heartburn, hematochezia and nausea.   Genitourinary: Negative for discharge, dysuria, frequency, genital sores, hematuria, impotence and urgency.   Musculoskeletal: Positive for arthralgias. Negative for joint swelling and myalgias.   Skin: Negative for rash.   Neurological: Negative for  "dizziness, weakness, headaches and paresthesias.   Psychiatric/Behavioral: Negative for mood changes. The patient is not nervous/anxious.      Patient notes some intermittent shoulder pain  Worse when at work, better when stretching    Also complains of periodic buttock/leg pain  Notes a slightly abnormal gait- puts more weight on the right lateral foot    OBJECTIVE:   /72 (BP Location: Right arm, Patient Position: Sitting, Cuff Size: Adult Large)   Pulse 69   Temp 98.2  F (36.8  C) (Oral)   Ht 1.803 m (5' 11\")   Wt 89.9 kg (198 lb 4.8 oz)   SpO2 98%   BMI 27.66 kg/m      Physical Exam  GENERAL: healthy, alert and no distress  EYES: Eyes grossly normal to inspection, PERRL and conjunctivae and sclerae normal  HENT: ear canals and TM's normal, nose and mouth without ulcers or lesions  NECK: no adenopathy, no asymmetry, masses, or scars and thyroid normal to palpation  RESP: lungs clear to auscultation - no rales, rhonchi or wheezes  CV: regular rate and rhythm, normal S1 S2, no S3 or S4, no murmur, click or rub, no peripheral edema and peripheral pulses strong  ABDOMEN: soft, nontender, no hepatosplenomegaly, no masses and bowel sounds normal  MS: no gross musculoskeletal defects noted, no edema  SKIN: no suspicious lesions or rashes  NEURO: Normal strength and tone, mentation intact and speech normal  PSYCH: mentation appears normal, affect normal/bright    Diagnostic Test Results:  none     ASSESSMENT/PLAN:   1. Routine general medical examination at a health care facility  - Lipid panel reflex to direct LDL Fasting  - Comprehensive metabolic panel  - TSH with free T4 reflex  - CBC with platelets    2. Piriformis muscle pain  3. Abnormal gait  - [facility], [facility]OPRACT[facility] REFERRAL; Future  New problem, suspect piriformis tightness, may contributing to gait issues.  Recommend PT for strengthening/stretching.    4. Chronic left shoulder pain  New problem, MSK in nature.  Recommend stretching and good " "ergonomics at work.  F/U if symptoms worsen or do not improve.      COUNSELING:   Reviewed preventive health counseling, as reflected in patient instructions    Estimated body mass index is 27.66 kg/m  as calculated from the following:    Height as of this encounter: 1.803 m (5' 11\").    Weight as of this encounter: 89.9 kg (198 lb 4.8 oz).          reports that he has never smoked. He has never used smokeless tobacco.      Counseling Resources:  ATP IV Guidelines  Pooled Cohorts Equation Calculator  FRAX Risk Assessment  ICSI Preventive Guidelines  Dietary Guidelines for Americans, 2010  USDA's MyPlate  ASA Prophylaxis  Lung CA Screening    Sherita Cannon PA-C  [facility]  "

## 2019-08-21 ENCOUNTER — OFFICE VISIT (OUTPATIENT)
Dept: FAMILY MEDICINE | Facility: CLINIC | Age: 29
End: 2019-08-21
Payer: COMMERCIAL

## 2019-08-21 VITALS
OXYGEN SATURATION: 98 % | HEIGHT: 71 IN | WEIGHT: 198.3 LBS | HEART RATE: 69 BPM | DIASTOLIC BLOOD PRESSURE: 72 MMHG | SYSTOLIC BLOOD PRESSURE: 112 MMHG | BODY MASS INDEX: 27.76 KG/M2 | TEMPERATURE: 98.2 F

## 2019-08-21 DIAGNOSIS — M25.512 CHRONIC LEFT SHOULDER PAIN: ICD-10-CM

## 2019-08-21 DIAGNOSIS — M79.18 PIRIFORMIS MUSCLE PAIN: ICD-10-CM

## 2019-08-21 DIAGNOSIS — R26.9 ABNORMAL GAIT: ICD-10-CM

## 2019-08-21 DIAGNOSIS — G89.29 CHRONIC LEFT SHOULDER PAIN: ICD-10-CM

## 2019-08-21 DIAGNOSIS — Z00.00 ROUTINE GENERAL MEDICAL EXAMINATION AT A HEALTH CARE FACILITY: Primary | ICD-10-CM

## 2019-08-21 PROBLEM — I83.93 VARICOSE VEINS OF BOTH LOWER EXTREMITIES, UNSPECIFIED WHETHER COMPLICATED: Status: ACTIVE | Noted: 2019-08-21

## 2019-08-21 LAB
ALBUMIN SERPL-MCNC: 4.1 G/DL (ref 3.4–5)
ALP SERPL-CCNC: 60 U/L (ref 40–150)
ALT SERPL W P-5'-P-CCNC: 21 U/L (ref 0–70)
ANION GAP SERPL CALCULATED.3IONS-SCNC: 3 MMOL/L (ref 3–14)
AST SERPL W P-5'-P-CCNC: 14 U/L (ref 0–45)
BILIRUB SERPL-MCNC: 0.4 MG/DL (ref 0.2–1.3)
BUN SERPL-MCNC: 13 MG/DL (ref 7–30)
CALCIUM SERPL-MCNC: 9.1 MG/DL (ref 8.5–10.1)
CHLORIDE SERPL-SCNC: 108 MMOL/L (ref 94–109)
CHOLEST SERPL-MCNC: 162 MG/DL
CO2 SERPL-SCNC: 30 MMOL/L (ref 20–32)
CREAT SERPL-MCNC: 1.07 MG/DL (ref 0.66–1.25)
ERYTHROCYTE [DISTWIDTH] IN BLOOD BY AUTOMATED COUNT: 11.6 % (ref 10–15)
GFR SERPL CREATININE-BSD FRML MDRD: >90 ML/MIN/{1.73_M2}
GLUCOSE SERPL-MCNC: 81 MG/DL (ref 70–99)
HCT VFR BLD AUTO: 43.1 % (ref 40–53)
HDLC SERPL-MCNC: 45 MG/DL
HGB BLD-MCNC: 14.3 G/DL (ref 13.3–17.7)
LDLC SERPL CALC-MCNC: 94 MG/DL
MCH RBC QN AUTO: 29.1 PG (ref 26.5–33)
MCHC RBC AUTO-ENTMCNC: 33.2 G/DL (ref 31.5–36.5)
MCV RBC AUTO: 88 FL (ref 78–100)
NONHDLC SERPL-MCNC: 117 MG/DL
PLATELET # BLD AUTO: 228 10E9/L (ref 150–450)
POTASSIUM SERPL-SCNC: 4.5 MMOL/L (ref 3.4–5.3)
PROT SERPL-MCNC: 7.8 G/DL (ref 6.8–8.8)
RBC # BLD AUTO: 4.91 10E12/L (ref 4.4–5.9)
SODIUM SERPL-SCNC: 141 MMOL/L (ref 133–144)
TRIGL SERPL-MCNC: 114 MG/DL
TSH SERPL DL<=0.005 MIU/L-ACNC: 1.1 MU/L (ref 0.4–4)
WBC # BLD AUTO: 6.4 10E9/L (ref 4–11)

## 2019-08-21 PROCEDURE — 84443 ASSAY THYROID STIM HORMONE: CPT | Performed by: PHYSICIAN ASSISTANT

## 2019-08-21 PROCEDURE — 80061 LIPID PANEL: CPT | Performed by: PHYSICIAN ASSISTANT

## 2019-08-21 PROCEDURE — 80053 COMPREHEN METABOLIC PANEL: CPT | Performed by: PHYSICIAN ASSISTANT

## 2019-08-21 PROCEDURE — 36415 COLL VENOUS BLD VENIPUNCTURE: CPT | Performed by: PHYSICIAN ASSISTANT

## 2019-08-21 PROCEDURE — 99395 PREV VISIT EST AGE 18-39: CPT | Performed by: PHYSICIAN ASSISTANT

## 2019-08-21 PROCEDURE — 85027 COMPLETE CBC AUTOMATED: CPT | Performed by: PHYSICIAN ASSISTANT

## 2019-08-21 ASSESSMENT — ENCOUNTER SYMPTOMS
ABDOMINAL PAIN: 0
HEMATURIA: 0
COUGH: 0
DYSURIA: 0
FREQUENCY: 0
HEADACHES: 0
DIZZINESS: 0
PALPITATIONS: 0
FEVER: 0
WEAKNESS: 0
MYALGIAS: 0
DIARRHEA: 0
ARTHRALGIAS: 1
PARESTHESIAS: 0
JOINT SWELLING: 0
SORE THROAT: 0
HEMATOCHEZIA: 0
SHORTNESS OF BREATH: 0
NAUSEA: 0
CHILLS: 0
HEARTBURN: 0
NERVOUS/ANXIOUS: 0
EYE PAIN: 0
CONSTIPATION: 0

## 2019-08-21 ASSESSMENT — MIFFLIN-ST. JEOR: SCORE: 1881.61

## 2019-09-13 DIAGNOSIS — J34.2 DEVIATED NASAL SEPTUM: ICD-10-CM

## 2019-09-13 DIAGNOSIS — R09.81 NASAL CONGESTION: ICD-10-CM

## 2019-09-13 RX ORDER — MOMETASONE FUROATE MONOHYDRATE 50 UG/1
2 SPRAY, METERED NASAL DAILY
Qty: 51 G | Refills: 3 | Status: SHIPPED | OUTPATIENT
Start: 2019-09-13 | End: 2021-04-27

## 2019-09-16 ENCOUNTER — APPOINTMENT (OUTPATIENT)
Dept: VASCULAR SURGERY | Facility: CLINIC | Age: 29
End: 2019-09-16
Payer: COMMERCIAL

## 2019-09-16 PROCEDURE — 93970 EXTREMITY STUDY: CPT | Performed by: SURGERY

## 2019-09-16 PROCEDURE — 99203 OFFICE O/P NEW LOW 30 MIN: CPT | Performed by: SURGERY

## 2019-10-08 ENCOUNTER — APPOINTMENT (OUTPATIENT)
Dept: VASCULAR SURGERY | Facility: CLINIC | Age: 29
End: 2019-10-08
Payer: COMMERCIAL

## 2019-10-08 PROCEDURE — 99207 ZZC VEINSOLUTIONS NO CHARGE VISIT: CPT | Performed by: SURGERY

## 2020-03-11 ENCOUNTER — HEALTH MAINTENANCE LETTER (OUTPATIENT)
Age: 30
End: 2020-03-11

## 2021-01-03 ENCOUNTER — HEALTH MAINTENANCE LETTER (OUTPATIENT)
Age: 31
End: 2021-01-03

## 2021-02-02 DIAGNOSIS — R11.2 NAUSEA WITH VOMITING: Primary | ICD-10-CM

## 2021-02-02 DIAGNOSIS — K51.90 MILD CHRONIC ULCERATIVE COLITIS (H): ICD-10-CM

## 2021-02-02 DIAGNOSIS — R10.84 ABDOMINAL PAIN, GENERALIZED: ICD-10-CM

## 2021-02-02 LAB
BASOPHILS # BLD AUTO: 0 10E9/L (ref 0–0.2)
BASOPHILS NFR BLD AUTO: 0.1 %
DIFFERENTIAL METHOD BLD: ABNORMAL
EOSINOPHIL # BLD AUTO: 0 10E9/L (ref 0–0.7)
EOSINOPHIL NFR BLD AUTO: 0.2 %
ERYTHROCYTE [DISTWIDTH] IN BLOOD BY AUTOMATED COUNT: 11.2 % (ref 10–15)
HCT VFR BLD AUTO: 39 % (ref 40–53)
HGB BLD-MCNC: 12.9 G/DL (ref 13.3–17.7)
LYMPHOCYTES # BLD AUTO: 1.6 10E9/L (ref 0.8–5.3)
LYMPHOCYTES NFR BLD AUTO: 9.9 %
MCH RBC QN AUTO: 28.9 PG (ref 26.5–33)
MCHC RBC AUTO-ENTMCNC: 33.1 G/DL (ref 31.5–36.5)
MCV RBC AUTO: 87 FL (ref 78–100)
MONOCYTES # BLD AUTO: 1.3 10E9/L (ref 0–1.3)
MONOCYTES NFR BLD AUTO: 8 %
NEUTROPHILS # BLD AUTO: 13.3 10E9/L (ref 1.6–8.3)
NEUTROPHILS NFR BLD AUTO: 81.8 %
PLATELET # BLD AUTO: 551 10E9/L (ref 150–450)
RBC # BLD AUTO: 4.47 10E12/L (ref 4.4–5.9)
WBC # BLD AUTO: 16.2 10E9/L (ref 4–11)

## 2021-02-02 PROCEDURE — 82784 ASSAY IGA/IGD/IGG/IGM EACH: CPT | Performed by: INTERNAL MEDICINE

## 2021-02-02 PROCEDURE — 86140 C-REACTIVE PROTEIN: CPT | Performed by: INTERNAL MEDICINE

## 2021-02-02 PROCEDURE — 82607 VITAMIN B-12: CPT | Performed by: INTERNAL MEDICINE

## 2021-02-02 PROCEDURE — 83516 IMMUNOASSAY NONANTIBODY: CPT | Performed by: INTERNAL MEDICINE

## 2021-02-02 PROCEDURE — 86256 FLUORESCENT ANTIBODY TITER: CPT | Mod: 90 | Performed by: INTERNAL MEDICINE

## 2021-02-02 PROCEDURE — 80053 COMPREHEN METABOLIC PANEL: CPT | Performed by: INTERNAL MEDICINE

## 2021-02-02 PROCEDURE — 82746 ASSAY OF FOLIC ACID SERUM: CPT | Performed by: INTERNAL MEDICINE

## 2021-02-02 PROCEDURE — 36415 COLL VENOUS BLD VENIPUNCTURE: CPT | Performed by: INTERNAL MEDICINE

## 2021-02-02 PROCEDURE — 83516 IMMUNOASSAY NONANTIBODY: CPT | Mod: 59 | Performed by: INTERNAL MEDICINE

## 2021-02-02 PROCEDURE — 82728 ASSAY OF FERRITIN: CPT | Performed by: INTERNAL MEDICINE

## 2021-02-02 PROCEDURE — 99000 SPECIMEN HANDLING OFFICE-LAB: CPT | Performed by: INTERNAL MEDICINE

## 2021-02-02 PROCEDURE — 85025 COMPLETE CBC W/AUTO DIFF WBC: CPT | Performed by: INTERNAL MEDICINE

## 2021-02-03 DIAGNOSIS — R11.2 NAUSEA WITH VOMITING: ICD-10-CM

## 2021-02-03 DIAGNOSIS — R10.84 ABDOMINAL PAIN, GENERALIZED: ICD-10-CM

## 2021-02-03 DIAGNOSIS — K51.90 MILD CHRONIC ULCERATIVE COLITIS (H): ICD-10-CM

## 2021-02-03 LAB
ALBUMIN SERPL-MCNC: 3.3 G/DL (ref 3.4–5)
ALP SERPL-CCNC: 57 U/L (ref 40–150)
ALT SERPL W P-5'-P-CCNC: 21 U/L (ref 0–70)
ANION GAP SERPL CALCULATED.3IONS-SCNC: 5 MMOL/L (ref 3–14)
AST SERPL W P-5'-P-CCNC: 10 U/L (ref 0–45)
BILIRUB SERPL-MCNC: 0.2 MG/DL (ref 0.2–1.3)
BUN SERPL-MCNC: 12 MG/DL (ref 7–30)
C COLI+JEJUNI+LARI FUSA STL QL NAA+PROBE: NOT DETECTED
C DIFF TOX B STL QL: NEGATIVE
CALCIUM SERPL-MCNC: 9 MG/DL (ref 8.5–10.1)
CHLORIDE SERPL-SCNC: 103 MMOL/L (ref 94–109)
CO2 SERPL-SCNC: 26 MMOL/L (ref 20–32)
CREAT SERPL-MCNC: 0.87 MG/DL (ref 0.66–1.25)
CRP SERPL-MCNC: 15 MG/L (ref 0–8)
EC STX1 GENE STL QL NAA+PROBE: NOT DETECTED
EC STX2 GENE STL QL NAA+PROBE: NOT DETECTED
ENTERIC PATHOGEN COMMENT: NORMAL
FERRITIN SERPL-MCNC: 113 NG/ML (ref 26–388)
FOLATE SERPL-MCNC: 13.3 NG/ML
GFR SERPL CREATININE-BSD FRML MDRD: >90 ML/MIN/{1.73_M2}
GLUCOSE SERPL-MCNC: 133 MG/DL (ref 70–99)
NOROV GI+II ORF1-ORF2 JNC STL QL NAA+PR: NOT DETECTED
POTASSIUM SERPL-SCNC: 4.3 MMOL/L (ref 3.4–5.3)
PROT SERPL-MCNC: 7.1 G/DL (ref 6.8–8.8)
RVA NSP5 STL QL NAA+PROBE: NOT DETECTED
SALMONELLA SP RPOD STL QL NAA+PROBE: NOT DETECTED
SHIGELLA SP+EIEC IPAH STL QL NAA+PROBE: NOT DETECTED
SODIUM SERPL-SCNC: 134 MMOL/L (ref 133–144)
SPECIMEN SOURCE: NORMAL
V CHOL+PARA RFBL+TRKH+TNAA STL QL NAA+PR: NOT DETECTED
VIT B12 SERPL-MCNC: 765 PG/ML (ref 193–986)
Y ENTERO RECN STL QL NAA+PROBE: NOT DETECTED

## 2021-02-03 PROCEDURE — 87329 GIARDIA AG IA: CPT | Mod: 59 | Performed by: INTERNAL MEDICINE

## 2021-02-03 PROCEDURE — 87506 IADNA-DNA/RNA PROBE TQ 6-11: CPT | Performed by: INTERNAL MEDICINE

## 2021-02-03 PROCEDURE — 83993 ASSAY FOR CALPROTECTIN FECAL: CPT | Performed by: INTERNAL MEDICINE

## 2021-02-03 PROCEDURE — 36415 COLL VENOUS BLD VENIPUNCTURE: CPT | Performed by: INTERNAL MEDICINE

## 2021-02-03 PROCEDURE — 87493 C DIFF AMPLIFIED PROBE: CPT | Mod: 59 | Performed by: INTERNAL MEDICINE

## 2021-02-03 PROCEDURE — 87328 CRYPTOSPORIDIUM AG IA: CPT | Performed by: INTERNAL MEDICINE

## 2021-02-04 LAB
C PARVUM AG STL QL IA: NEGATIVE
G LAMBLIA AG STL QL IA: NEGATIVE
GLIADIN IGA SER-ACNC: 2 U/ML
GLIADIN IGG SER-ACNC: <1 U/ML
IGA SERPL-MCNC: 214 MG/DL (ref 84–499)
SPECIMEN SOURCE: NORMAL
TTG IGA SER-ACNC: 1 U/ML
TTG IGG SER-ACNC: <1 U/ML

## 2021-02-05 ENCOUNTER — TRANSFERRED RECORDS (OUTPATIENT)
Dept: HEALTH INFORMATION MANAGEMENT | Facility: CLINIC | Age: 31
End: 2021-02-05

## 2021-02-05 LAB
CALPROTECTIN STL-MCNT: 1540 MG/KG (ref 0–49.9)
ENDOMYSIUM IGA TITR SER IF: NORMAL {TITER}

## 2021-03-01 DIAGNOSIS — K51.90 ULCERATIVE COLITIS WITHOUT COMPLICATIONS, UNSPECIFIED LOCATION (H): ICD-10-CM

## 2021-03-01 DIAGNOSIS — R19.7 DIARRHEA, UNSPECIFIED TYPE: Primary | ICD-10-CM

## 2021-03-02 DIAGNOSIS — R19.7 DIARRHEA, UNSPECIFIED TYPE: ICD-10-CM

## 2021-03-02 DIAGNOSIS — K51.90 ULCERATIVE COLITIS WITHOUT COMPLICATIONS, UNSPECIFIED LOCATION (H): ICD-10-CM

## 2021-03-02 LAB
C DIFF TOX B STL QL: NEGATIVE
SPECIMEN SOURCE: NORMAL

## 2021-03-02 PROCEDURE — 87493 C DIFF AMPLIFIED PROBE: CPT | Performed by: PHYSICIAN ASSISTANT

## 2021-03-22 ENCOUNTER — TRANSFERRED RECORDS (OUTPATIENT)
Dept: HEALTH INFORMATION MANAGEMENT | Facility: CLINIC | Age: 31
End: 2021-03-22

## 2021-03-25 ENCOUNTER — TRANSFERRED RECORDS (OUTPATIENT)
Dept: HEALTH INFORMATION MANAGEMENT | Facility: CLINIC | Age: 31
End: 2021-03-25

## 2021-04-09 ENCOUNTER — TRANSFERRED RECORDS (OUTPATIENT)
Dept: HEALTH INFORMATION MANAGEMENT | Facility: CLINIC | Age: 31
End: 2021-04-09

## 2021-04-16 DIAGNOSIS — K51.90 ULCERATIVE COLITIS (H): Primary | ICD-10-CM

## 2021-04-16 LAB
ALBUMIN SERPL-MCNC: 3.7 G/DL (ref 3.4–5)
ALP SERPL-CCNC: 59 U/L (ref 40–150)
ALT SERPL W P-5'-P-CCNC: 34 U/L (ref 0–70)
ANION GAP SERPL CALCULATED.3IONS-SCNC: 1 MMOL/L (ref 3–14)
AST SERPL W P-5'-P-CCNC: 16 U/L (ref 0–45)
BASOPHILS # BLD AUTO: 0 10E9/L (ref 0–0.2)
BASOPHILS NFR BLD AUTO: 0.5 %
BILIRUB SERPL-MCNC: 0.3 MG/DL (ref 0.2–1.3)
BUN SERPL-MCNC: 9 MG/DL (ref 7–30)
CALCIUM SERPL-MCNC: 8.6 MG/DL (ref 8.5–10.1)
CHLORIDE SERPL-SCNC: 110 MMOL/L (ref 94–109)
CO2 SERPL-SCNC: 29 MMOL/L (ref 20–32)
CREAT SERPL-MCNC: 0.88 MG/DL (ref 0.66–1.25)
CRP SERPL-MCNC: <2.9 MG/L (ref 0–8)
DIFFERENTIAL METHOD BLD: ABNORMAL
EOSINOPHIL # BLD AUTO: 0.1 10E9/L (ref 0–0.7)
EOSINOPHIL NFR BLD AUTO: 1 %
ERYTHROCYTE [DISTWIDTH] IN BLOOD BY AUTOMATED COUNT: 15.3 % (ref 10–15)
FOLATE SERPL-MCNC: 18.4 NG/ML
GFR SERPL CREATININE-BSD FRML MDRD: >90 ML/MIN/{1.73_M2}
GLUCOSE SERPL-MCNC: 95 MG/DL (ref 70–99)
HCT VFR BLD AUTO: 29.6 % (ref 40–53)
HGB BLD-MCNC: 8.4 G/DL (ref 13.3–17.7)
LYMPHOCYTES # BLD AUTO: 2.1 10E9/L (ref 0.8–5.3)
LYMPHOCYTES NFR BLD AUTO: 36 %
MCH RBC QN AUTO: 22 PG (ref 26.5–33)
MCHC RBC AUTO-ENTMCNC: 28.4 G/DL (ref 31.5–36.5)
MCV RBC AUTO: 78 FL (ref 78–100)
MONOCYTES # BLD AUTO: 0.5 10E9/L (ref 0–1.3)
MONOCYTES NFR BLD AUTO: 8.6 %
NEUTROPHILS # BLD AUTO: 3.2 10E9/L (ref 1.6–8.3)
NEUTROPHILS NFR BLD AUTO: 53.9 %
PLATELET # BLD AUTO: 366 10E9/L (ref 150–450)
POTASSIUM SERPL-SCNC: 3.8 MMOL/L (ref 3.4–5.3)
PROT SERPL-MCNC: 7.2 G/DL (ref 6.8–8.8)
RBC # BLD AUTO: 3.81 10E12/L (ref 4.4–5.9)
SODIUM SERPL-SCNC: 140 MMOL/L (ref 133–144)
VIT B12 SERPL-MCNC: 471 PG/ML (ref 193–986)
WBC # BLD AUTO: 5.9 10E9/L (ref 4–11)

## 2021-04-16 PROCEDURE — 80053 COMPREHEN METABOLIC PANEL: CPT | Performed by: INTERNAL MEDICINE

## 2021-04-16 PROCEDURE — 86140 C-REACTIVE PROTEIN: CPT | Performed by: INTERNAL MEDICINE

## 2021-04-16 PROCEDURE — 36415 COLL VENOUS BLD VENIPUNCTURE: CPT | Performed by: INTERNAL MEDICINE

## 2021-04-16 PROCEDURE — 85025 COMPLETE CBC W/AUTO DIFF WBC: CPT | Performed by: INTERNAL MEDICINE

## 2021-04-16 PROCEDURE — 82746 ASSAY OF FOLIC ACID SERUM: CPT | Performed by: INTERNAL MEDICINE

## 2021-04-16 PROCEDURE — 82607 VITAMIN B-12: CPT | Performed by: INTERNAL MEDICINE

## 2021-04-27 ENCOUNTER — OFFICE VISIT (OUTPATIENT)
Dept: FAMILY MEDICINE | Facility: CLINIC | Age: 31
End: 2021-04-27
Payer: COMMERCIAL

## 2021-04-27 VITALS
OXYGEN SATURATION: 100 % | DIASTOLIC BLOOD PRESSURE: 68 MMHG | TEMPERATURE: 98.4 F | RESPIRATION RATE: 16 BRPM | SYSTOLIC BLOOD PRESSURE: 118 MMHG | HEART RATE: 63 BPM | WEIGHT: 184.1 LBS | BODY MASS INDEX: 27.27 KG/M2 | HEIGHT: 69 IN

## 2021-04-27 DIAGNOSIS — K51.80 OTHER ULCERATIVE COLITIS WITHOUT COMPLICATION (H): Primary | ICD-10-CM

## 2021-04-27 LAB
BASOPHILS # BLD AUTO: 0.1 10E9/L (ref 0–0.2)
BASOPHILS NFR BLD AUTO: 0.6 %
DIFFERENTIAL METHOD BLD: ABNORMAL
EOSINOPHIL # BLD AUTO: 0.1 10E9/L (ref 0–0.7)
EOSINOPHIL NFR BLD AUTO: 1.2 %
ERYTHROCYTE [DISTWIDTH] IN BLOOD BY AUTOMATED COUNT: 15.7 % (ref 10–15)
HCT VFR BLD AUTO: 29.9 % (ref 40–53)
HGB BLD-MCNC: 8.4 G/DL (ref 13.3–17.7)
LYMPHOCYTES # BLD AUTO: 3.2 10E9/L (ref 0.8–5.3)
LYMPHOCYTES NFR BLD AUTO: 35.4 %
MCH RBC QN AUTO: 21.1 PG (ref 26.5–33)
MCHC RBC AUTO-ENTMCNC: 28.1 G/DL (ref 31.5–36.5)
MCV RBC AUTO: 75 FL (ref 78–100)
MONOCYTES # BLD AUTO: 1 10E9/L (ref 0–1.3)
MONOCYTES NFR BLD AUTO: 11.1 %
NEUTROPHILS # BLD AUTO: 4.6 10E9/L (ref 1.6–8.3)
NEUTROPHILS NFR BLD AUTO: 51.7 %
PLATELET # BLD AUTO: 445 10E9/L (ref 150–450)
RBC # BLD AUTO: 3.98 10E12/L (ref 4.4–5.9)
WBC # BLD AUTO: 9 10E9/L (ref 4–11)

## 2021-04-27 PROCEDURE — 83550 IRON BINDING TEST: CPT | Performed by: PHYSICIAN ASSISTANT

## 2021-04-27 PROCEDURE — 83540 ASSAY OF IRON: CPT | Performed by: PHYSICIAN ASSISTANT

## 2021-04-27 PROCEDURE — 80048 BASIC METABOLIC PNL TOTAL CA: CPT | Performed by: PHYSICIAN ASSISTANT

## 2021-04-27 PROCEDURE — 82728 ASSAY OF FERRITIN: CPT | Performed by: PHYSICIAN ASSISTANT

## 2021-04-27 PROCEDURE — 99214 OFFICE O/P EST MOD 30 MIN: CPT | Performed by: PHYSICIAN ASSISTANT

## 2021-04-27 PROCEDURE — 36415 COLL VENOUS BLD VENIPUNCTURE: CPT | Performed by: PHYSICIAN ASSISTANT

## 2021-04-27 PROCEDURE — 85025 COMPLETE CBC W/AUTO DIFF WBC: CPT | Performed by: PHYSICIAN ASSISTANT

## 2021-04-27 RX ORDER — PANTOPRAZOLE SODIUM 40 MG/1
40 TABLET, DELAYED RELEASE ORAL DAILY
COMMUNITY
Start: 2021-03-08

## 2021-04-27 RX ORDER — MESALAMINE 1.2 G/1
TABLET, DELAYED RELEASE ORAL
COMMUNITY
Start: 2021-03-27 | End: 2023-06-16

## 2021-04-27 ASSESSMENT — MIFFLIN-ST. JEOR: SCORE: 1780.45

## 2021-04-27 NOTE — PROGRESS NOTES
"    Assessment & Plan     Other ulcerative colitis without complication (H)  Reviewed patient recent flare hx and current treatments. He is thankfully doing better now and truly I cant imagine how awful he may have been feeling at its height if his CBC looks the way it did a few days ago. We'll trend it today. Continue the ongoing management. Consider iron supplementation per results. Follow up  Annual in 6 months. Updates per results today  - CBC with platelets differential  - Ferritin  - Iron and iron binding capacity  - Basic metabolic panel    Review of the result(s) of each unique test - see labs in EPIC       BMI:   Estimated body mass index is 27.19 kg/m  as calculated from the following:    Height as of this encounter: 1.753 m (5' 9\").    Weight as of this encounter: 83.5 kg (184 lb 1.6 oz).       Return in about 6 months (around 10/27/2021).    Jeremiah Esparza PA-C  [facility] AMANDEEP Cavanaugh is a 30 year old who presents for the following health issues     HPI     Concerns: Go over test results.   He received a diagnosis of UC around 2019  He was treating for a while with mesalamine suppositories  Had another flare in early February - had colonoscopy and EGD and diagnosed with full colitis  Started on prednisone high dose which helped again   In March he was placed on mesalamine and protonix for hiatal hernia/gerd  During his flare in January to March he lost plenty of blood and weight    -down to 147 on March 1st   -since then his symptoms have improved and he is gaining weight  He would like to review blood work today      Review of Systems   Constitutional, HEENT, cardiovascular, pulmonary, gi and gu systems are negative, except as otherwise noted.      Objective    /68 (BP Location: Right arm, Patient Position: Chair, Cuff Size: Adult Regular)   Pulse 63   Temp 98.4  F (36.9  C) (Tympanic)   Resp 16   Ht 1.753 m (5' 9\")   Wt 83.5 kg (184 lb 1.6 oz)   " SpO2 100%   BMI 27.19 kg/m    Body mass index is 27.19 kg/m .  Physical Exam   GENERAL: healthy, alert and no distress  EYES: conjunctival pallor  RESP: lungs clear to auscultation - no rales, rhonchi or wheezes  CV: regular rate and rhythm, normal S1 S2, no S3 or S4, no murmur, click or rub, no peripheral edema and peripheral pulses strong  ABDOMEN: soft, nontender, no hepatosplenomegaly, no masses and bowel sounds normal  SKIN: no suspicious lesions or rashes  PSYCH: mentation appears normal, affect normal/bright    Lab Results   Component Value Date    WBC 5.9 04/16/2021     Lab Results   Component Value Date    RBC 3.81 04/16/2021     Lab Results   Component Value Date    HGB 8.4 04/16/2021     Lab Results   Component Value Date    HCT 29.6 04/16/2021     No components found for: MCT  Lab Results   Component Value Date    MCV 78 04/16/2021     Lab Results   Component Value Date    MCH 22.0 04/16/2021     Lab Results   Component Value Date    MCHC 28.4 04/16/2021     Lab Results   Component Value Date    RDW 15.3 04/16/2021     Lab Results   Component Value Date     04/16/2021

## 2021-04-28 LAB
ANION GAP SERPL CALCULATED.3IONS-SCNC: 4 MMOL/L (ref 3–14)
BUN SERPL-MCNC: 12 MG/DL (ref 7–30)
CALCIUM SERPL-MCNC: 8.8 MG/DL (ref 8.5–10.1)
CHLORIDE SERPL-SCNC: 107 MMOL/L (ref 94–109)
CO2 SERPL-SCNC: 28 MMOL/L (ref 20–32)
CREAT SERPL-MCNC: 1.01 MG/DL (ref 0.66–1.25)
FERRITIN SERPL-MCNC: 4 NG/ML (ref 26–388)
GFR SERPL CREATININE-BSD FRML MDRD: >90 ML/MIN/{1.73_M2}
GLUCOSE SERPL-MCNC: 85 MG/DL (ref 70–99)
IRON SATN MFR SERPL: 2 % (ref 15–46)
IRON SERPL-MCNC: 9 UG/DL (ref 35–180)
POTASSIUM SERPL-SCNC: 4 MMOL/L (ref 3.4–5.3)
SODIUM SERPL-SCNC: 139 MMOL/L (ref 133–144)
TIBC SERPL-MCNC: 448 UG/DL (ref 240–430)

## 2021-07-23 ENCOUNTER — E-VISIT (OUTPATIENT)
Dept: FAMILY MEDICINE | Facility: CLINIC | Age: 31
End: 2021-07-23
Payer: COMMERCIAL

## 2021-07-23 DIAGNOSIS — K92.1 HEMATOCHEZIA: Primary | ICD-10-CM

## 2021-07-23 PROCEDURE — 99207 PR NON-BILLABLE SERV PER CHARTING: CPT | Performed by: PHYSICIAN ASSISTANT

## 2021-07-27 ENCOUNTER — TRANSFERRED RECORDS (OUTPATIENT)
Dept: HEALTH INFORMATION MANAGEMENT | Facility: CLINIC | Age: 31
End: 2021-07-27

## 2021-07-27 ENCOUNTER — LAB (OUTPATIENT)
Dept: LAB | Facility: CLINIC | Age: 31
End: 2021-07-27
Payer: COMMERCIAL

## 2021-07-27 DIAGNOSIS — K51.90 MILD CHRONIC ULCERATIVE COLITIS WITHOUT COMPLICATION (H): Primary | ICD-10-CM

## 2021-07-27 LAB
BASOPHILS # BLD AUTO: 0.1 10E3/UL (ref 0–0.2)
BASOPHILS NFR BLD AUTO: 1 %
EOSINOPHIL # BLD AUTO: 0.2 10E3/UL (ref 0–0.7)
EOSINOPHIL NFR BLD AUTO: 2 %
ERYTHROCYTE [DISTWIDTH] IN BLOOD BY AUTOMATED COUNT: 16.2 % (ref 10–15)
HCT VFR BLD AUTO: 39.1 % (ref 40–53)
HGB BLD-MCNC: 12.4 G/DL (ref 13.3–17.7)
LYMPHOCYTES # BLD AUTO: 2.3 10E3/UL (ref 0.8–5.3)
LYMPHOCYTES NFR BLD AUTO: 24 %
MCH RBC QN AUTO: 26.3 PG (ref 26.5–33)
MCHC RBC AUTO-ENTMCNC: 31.7 G/DL (ref 31.5–36.5)
MCV RBC AUTO: 83 FL (ref 78–100)
MONOCYTES # BLD AUTO: 0.8 10E3/UL (ref 0–1.3)
MONOCYTES NFR BLD AUTO: 8 %
NEUTROPHILS # BLD AUTO: 6.4 10E3/UL (ref 1.6–8.3)
NEUTROPHILS NFR BLD AUTO: 66 %
PLATELET # BLD AUTO: 348 10E3/UL (ref 150–450)
RBC # BLD AUTO: 4.71 10E6/UL (ref 4.4–5.9)
WBC # BLD AUTO: 9.8 10E3/UL (ref 4–11)

## 2021-07-27 PROCEDURE — 36415 COLL VENOUS BLD VENIPUNCTURE: CPT

## 2021-07-27 PROCEDURE — 86140 C-REACTIVE PROTEIN: CPT

## 2021-07-27 PROCEDURE — 87493 C DIFF AMPLIFIED PROBE: CPT

## 2021-07-27 PROCEDURE — 80053 COMPREHEN METABOLIC PANEL: CPT

## 2021-07-27 PROCEDURE — 85025 COMPLETE CBC W/AUTO DIFF WBC: CPT

## 2021-07-28 LAB
C DIFF TOX B STL QL: NEGATIVE
CRP SERPL-MCNC: 3 MG/L (ref 0–8)

## 2021-07-28 PROCEDURE — 87506 IADNA-DNA/RNA PROBE TQ 6-11: CPT

## 2021-07-30 LAB
ALBUMIN SERPL-MCNC: 3.8 G/DL (ref 3.4–5)
ALP SERPL-CCNC: 50 U/L (ref 40–150)
ALT SERPL W P-5'-P-CCNC: 22 U/L (ref 0–70)
ANION GAP SERPL CALCULATED.3IONS-SCNC: 4 MMOL/L (ref 3–14)
AST SERPL W P-5'-P-CCNC: 14 U/L (ref 0–45)
BILIRUB SERPL-MCNC: 0.2 MG/DL (ref 0.2–1.3)
BUN SERPL-MCNC: 10 MG/DL (ref 7–30)
CALCIUM SERPL-MCNC: 9 MG/DL (ref 8.5–10.1)
CHLORIDE BLD-SCNC: 107 MMOL/L (ref 94–109)
CO2 SERPL-SCNC: 27 MMOL/L (ref 20–32)
CREAT SERPL-MCNC: 1.21 MG/DL (ref 0.66–1.25)
GFR SERPL CREATININE-BSD FRML MDRD: 79 ML/MIN/1.73M2
GLUCOSE BLD-MCNC: 87 MG/DL (ref 70–99)
POTASSIUM BLD-SCNC: 4.5 MMOL/L (ref 3.4–5.3)
PROT SERPL-MCNC: 7 G/DL (ref 6.8–8.8)
SODIUM SERPL-SCNC: 138 MMOL/L (ref 133–144)

## 2021-08-30 ENCOUNTER — TRANSFERRED RECORDS (OUTPATIENT)
Dept: HEALTH INFORMATION MANAGEMENT | Facility: CLINIC | Age: 31
End: 2021-08-30

## 2021-10-10 ENCOUNTER — HEALTH MAINTENANCE LETTER (OUTPATIENT)
Age: 31
End: 2021-10-10

## 2021-10-11 ENCOUNTER — TRANSFERRED RECORDS (OUTPATIENT)
Dept: HEALTH INFORMATION MANAGEMENT | Facility: CLINIC | Age: 31
End: 2021-10-11
Payer: COMMERCIAL

## 2022-01-14 ENCOUNTER — TRANSFERRED RECORDS (OUTPATIENT)
Dept: HEALTH INFORMATION MANAGEMENT | Facility: CLINIC | Age: 32
End: 2022-01-14
Payer: COMMERCIAL

## 2022-01-14 PROCEDURE — 88305 TISSUE EXAM BY PATHOLOGIST: CPT | Performed by: PATHOLOGY

## 2022-01-17 ENCOUNTER — LAB REQUISITION (OUTPATIENT)
Dept: LAB | Facility: CLINIC | Age: 32
End: 2022-01-17

## 2022-01-17 DIAGNOSIS — K63.3 ULCER OF INTESTINE: ICD-10-CM

## 2022-01-17 DIAGNOSIS — K62.89 OTHER SPECIFIED DISEASES OF ANUS AND RECTUM: ICD-10-CM

## 2022-01-17 DIAGNOSIS — K51.011 ULCERATIVE (CHRONIC) PANCOLITIS WITH RECTAL BLEEDING (H): ICD-10-CM

## 2022-01-17 DIAGNOSIS — K62.6 ULCER OF ANUS AND RECTUM: ICD-10-CM

## 2022-01-17 DIAGNOSIS — K62.5 HEMORRHAGE OF ANUS AND RECTUM: ICD-10-CM

## 2022-01-17 DIAGNOSIS — K63.89 OTHER SPECIFIED DISEASES OF INTESTINE: ICD-10-CM

## 2022-01-17 DIAGNOSIS — R10.84 GENERALIZED ABDOMINAL PAIN: ICD-10-CM

## 2022-01-17 DIAGNOSIS — K92.9 DISEASE OF DIGESTIVE SYSTEM, UNSPECIFIED: ICD-10-CM

## 2022-01-18 LAB
PATH REPORT.COMMENTS IMP SPEC: NORMAL
PATH REPORT.COMMENTS IMP SPEC: NORMAL
PATH REPORT.FINAL DX SPEC: NORMAL
PATH REPORT.GROSS SPEC: NORMAL
PATH REPORT.MICROSCOPIC SPEC OTHER STN: NORMAL
PATH REPORT.RELEVANT HX SPEC: NORMAL
PHOTO IMAGE: NORMAL

## 2022-01-30 ENCOUNTER — HEALTH MAINTENANCE LETTER (OUTPATIENT)
Age: 32
End: 2022-01-30

## 2022-02-04 ENCOUNTER — TELEPHONE (OUTPATIENT)
Dept: BEHAVIORAL HEALTH | Facility: CLINIC | Age: 32
End: 2022-02-04
Payer: COMMERCIAL

## 2022-02-07 ASSESSMENT — ANXIETY QUESTIONNAIRES
1. FEELING NERVOUS, ANXIOUS, OR ON EDGE: SEVERAL DAYS
7. FEELING AFRAID AS IF SOMETHING AWFUL MIGHT HAPPEN: NOT AT ALL
6. BECOMING EASILY ANNOYED OR IRRITABLE: MORE THAN HALF THE DAYS
7. FEELING AFRAID AS IF SOMETHING AWFUL MIGHT HAPPEN: NOT AT ALL
2. NOT BEING ABLE TO STOP OR CONTROL WORRYING: SEVERAL DAYS
4. TROUBLE RELAXING: MORE THAN HALF THE DAYS
5. BEING SO RESTLESS THAT IT IS HARD TO SIT STILL: NOT AT ALL
3. WORRYING TOO MUCH ABOUT DIFFERENT THINGS: MORE THAN HALF THE DAYS
GAD7 TOTAL SCORE: 8

## 2022-02-07 ASSESSMENT — PATIENT HEALTH QUESTIONNAIRE - PHQ9
10. IF YOU CHECKED OFF ANY PROBLEMS, HOW DIFFICULT HAVE THESE PROBLEMS MADE IT FOR YOU TO DO YOUR WORK, TAKE CARE OF THINGS AT HOME, OR GET ALONG WITH OTHER PEOPLE: NOT DIFFICULT AT ALL
SUM OF ALL RESPONSES TO PHQ QUESTIONS 1-9: 6
SUM OF ALL RESPONSES TO PHQ QUESTIONS 1-9: 6

## 2022-02-08 ENCOUNTER — HOSPITAL ENCOUNTER (OUTPATIENT)
Dept: BEHAVIORAL HEALTH | Facility: CLINIC | Age: 32
End: 2022-02-08
Attending: FAMILY MEDICINE
Payer: COMMERCIAL

## 2022-02-08 PROCEDURE — 999N000216 HC STATISTIC ADULT CD FACE TO FACE-NO CHRG: Mod: GT,95 | Performed by: COUNSELOR

## 2022-02-08 ASSESSMENT — COLUMBIA-SUICIDE SEVERITY RATING SCALE - C-SSRS
ATTEMPT LIFETIME: NO
ATTEMPT PAST THREE MONTHS: NO
6. HAVE YOU EVER DONE ANYTHING, STARTED TO DO ANYTHING, OR PREPARED TO DO ANYTHING TO END YOUR LIFE?: NO
4. HAVE YOU HAD THESE THOUGHTS AND HAD SOME INTENTION OF ACTING ON THEM?: NO
2. HAVE YOU ACTUALLY HAD ANY THOUGHTS OF KILLING YOURSELF LIFETIME?: NO
1. IN THE PAST MONTH, HAVE YOU WISHED YOU WERE DEAD OR WISHED YOU COULD GO TO SLEEP AND NOT WAKE UP?: NO
2. HAVE YOU ACTUALLY HAD ANY THOUGHTS OF KILLING YOURSELF?: NO
TOTAL  NUMBER OF ABORTED OR SELF INTERRUPTED ATTEMPTS PAST LIFETIME: NO
3. HAVE YOU BEEN THINKING ABOUT HOW YOU MIGHT KILL YOURSELF?: NO
5. HAVE YOU STARTED TO WORK OUT OR WORKED OUT THE DETAILS OF HOW TO KILL YOURSELF? DO YOU INTEND TO CARRY OUT THIS PLAN?: NO
1. IN THE PAST MONTH, HAVE YOU WISHED YOU WERE DEAD OR WISHED YOU COULD GO TO SLEEP AND NOT WAKE UP?: NO
TOTAL  NUMBER OF INTERRUPTED ATTEMPTS LIFETIME: NO
TOTAL  NUMBER OF ABORTED OR SELF INTERRUPTED ATTEMPTS PAST 3 MONTHS: NO
6. HAVE YOU EVER DONE ANYTHING, STARTED TO DO ANYTHING, OR PREPARED TO DO ANYTHING TO END YOUR LIFE?: NO
4. HAVE YOU HAD THESE THOUGHTS AND HAD SOME INTENTION OF ACTING ON THEM?: NO
5. HAVE YOU STARTED TO WORK OUT OR WORKED OUT THE DETAILS OF HOW TO KILL YOURSELF? DO YOU INTEND TO CARRY OUT THIS PLAN?: NO
TOTAL  NUMBER OF INTERRUPTED ATTEMPTS PAST 3 MONTHS: NO

## 2022-02-08 ASSESSMENT — ANXIETY QUESTIONNAIRES: GAD7 TOTAL SCORE: 8

## 2022-02-08 ASSESSMENT — PATIENT HEALTH QUESTIONNAIRE - PHQ9: SUM OF ALL RESPONSES TO PHQ QUESTIONS 1-9: 6

## 2022-02-08 NOTE — PATIENT INSTRUCTIONS
Mao,  It was a pleasure meeting with you today. Below is the scheduled individual therapy appointment details. I also put a resource below. If you have any questions or want more resources feel free to reach out to me.     INDIVIDUAL THERAPY APPOINTMENT SCHEDULED  Date: Friday, 2/18/2022  Time: 11:00 am - 12:00 pm  Provider: Sandhya Vasquez MA, LPC  Location: [facility], [address] [place] [address]  Phone: [phone]  Type: Teletherapy  Website:[email or web address]    RESOURCE  [facility] (free short term therapy)  Website: [email or web address]    Jasmina Little LPCC, [facility]  Licensed Psychotherapist   [facility]  [facility] [email or web address]  [email or web address]  Office: [phone]  Fax: [phone]  Gender pronouns: she/her/hers

## 2022-02-08 NOTE — PROGRESS NOTES
"[facility]    ADULT Mental Health Assessment Appointment Note    PATIENT'S NAME:  Mao Patel    Preferred Pronoun: He/him/his  MRN: [id number]  YOB: 1990  Address:  [address] [place] [id number]  PREFERRED PHONE: [phone]  May we leave a referral related message: Yes    DATE OF SERVICE: 2/08/22  START TIME: 10:03am  END TIME: 10:37am  SERVICE MODALITY:  Video Visit:      Provider verified identity through the following two step process.  Patient provided:  Patient photo    Telemedicine Visit: The patient's condition can be safely assessed and treated via synchronous audio and visual telemedicine encounter.      Reason for Telemedicine Visit: Services only offered telehealth    Originating Site (Patient Location): Patient's home    Distant Site (Provider Location): [facility]    Consent:  The patient/guardian has verbally consented to: the potential risks and benefits of telemedicine (video visit) versus in person care; bill my insurance or make self-payment for services provided; and responsibility for payment of non-covered services.     Patient would like the video invitation sent by:  My Chart    Mode of Communication:  Video Conference via [employer]    As the provider I attest to compliance with applicable laws and regulations related to telemedicine.    Identifying Information:  Patient is a 31 year old,  male.  Patient was referred for an assessment by self.  Patient attended the session alone. Patient identified their preferred language to be English. Patient reported they does not need the assistance of an  or other support involved in therapy.     Services Requested:   The reason for seeking services at this time is: \"My situation is not critical, however, I don t want to neglect recent feelings. Want to explore therapy to learn how to cope with feelings of " "anxiety, worry, unacomplishement, feeling lazy or uninterested in things I normally enjoy. I ve been feeling unmotivated to do the things I normally enjoy, I find it hard to get out of bed and be disciplined with the things I said I would do, I m not sure if it s work stress or health related (taking medication for an autoimmune disease). I would like to explore getting psychological  therapy and work on issues that I may not even be aware of, have not been able to express or don t know how to deal with\". The problem(s) began 12/07/21. Patient reports, I started noticing more late last year but has been an on going thing. Thought it was a work related stress thing. Nagging thing going on in my mind. Whether I am comparing myself to others, thinking I am not making enough money or doing enough work. Feel anxious about not overachieving. Patient reports, the things I used to enjoy doing just not feeling the same about, not being able to read because not being able to concentrate, thinking about something else. Feeling silly about that. Playing videogames I get bored doing them. Patient reports, I have a good job, wife, and son on the way. I should not be feeling this way and regardless I am. Instead of letting it slide and letting it go away want to start working with a therapist.  Patient has not attempted to resolve these concerns in the past.  Patient does not have legal concerns.    Mental Health:  Review of Symptoms per patient report:  Depression: Lack of interest, Change in energy level, Difficulties concentrating, Change in appetite and Feeling sad, down, or depressed, patient reports, I am taking some medication, pantoprazole, sleep might get disrupted by that and had some night terrors with sweating as part of medicine side effect. Other then that without the side effects of drug pretty good.  Hygiene-that is okay.   Pamella: No Symptoms  Psychosis: No Symptoms  Anxiety: Excessive worry, Nervousness, " Ruminations, Poor concentration and Irritability, patient reports, it is harder to compartmentalize things. Sometimes it is really easy but sometimes it is really hard. Will be reading,  a book, and have a hard time getting engaged with it. Used to find a lot of aline in doing that. Sometimes it is a problem with work and sometimes it is not. My brain wants to jump around every five minutes. Does not want to stick to something for a hour. Patient reports, thinking about too many things at the same time. Multitasking but more jumping from one thing to another. Million things open and nothing closed during work day, in terms of concentration. Having hard time with self talk, telling self by this time I should be doing this in my life and comparing self to others. Patient reports, [place] moved to [place] ago from [place], moving to different country in your 30's. Already had career and life in [place]. Patient reports when we came here we were financially treated like we are 17 when purchasing things like house and car.  All of that has accumulated this past year. Patient reports, get back into lifestyle we are expecting at this time. Especially now that baby is coming, this is real now. Got to keep up work, diapers are not cheap. At the same time pressure is eating me inside. I am feeling like crap for comparing myself. Patient reports, I manage to get work day done but burnt out by end of day and no energy to go to gym or read the book. Go to bed and get up and do it all again. Working from home for the past two years. Patient reports it comes to the weekend and our outing for the weekend is getting groceries, eating junk stuff. That routine and thoughts are taking a toll on me. I was diagnosed with ulcerative colitis and they are not sure the root cause but mentioned combinations of factors, one is stress. Been able to control with medication but having to take stronger medication.   Panic: Palpitations,  patient reports, two different stresses, work related, thrive in that stress, and get things done. Working under pressure, do notice increase in heart beat, but excited heart beat, it does drain energy and is a long day. Breathing, changes and hold breath and clentch muscles, challenging type stress. Patient reports, stress realted to thoughts and insecurities I kind of shut down. Paralyzes me. For example, going to bake a cake but the amount of things I need to learn and research instead of finding it interesting, paralyzing self and I stop half way. Same thing when I quit all the social media, anxious around it. Ended up happening was paralyzing self. Thoughts of never going to be like that so why even bother. Have not noticed physical symptoms but mind shuts down. Do not tend to explode but so feel like it. Patient reports, I realize that might be medication I am taking that have those side effects. Very rare but do have that feeling but try to realize and recognize it. Like driving in traffic.   Post Traumatic Stress Disorder: No Symptoms  Eating Disorder: No Symptoms  ADD / ADHD: No symptoms  Conduct Disorder: No symptoms  Autism Spectrum Disorder: No symptoms  Obsessive Compulsive Disorder: No Symptoms    Substance Use:  Do you have a history or current concern of alcohol or illicit drug use? No issues identified        Substance History of use Age of first use Date of last use       Pattern and duration of use (include amounts and frequency)   Alcohol used in the past    17 08/07/21 REPORTS SUBSTANCE USE: N/A   Cannabis    used in the past 27 11/15/20 REPORTS SUBSTANCE USE: N/A      Amphetamines    never used   REPORTS SUBSTANCE USE: N/A   Cocaine/crack     never used       REPORTS SUBSTANCE USE: N/A   Hallucinogens never used         REPORTS SUBSTANCE USE: N/A   Inhalants never used         REPORTS SUBSTANCE USE: N/A   Heroin never used         REPORTS SUBSTANCE USE: N/A   Other Opiates never used   REPORTS  SUBSTANCE USE: N/A   Benzodiazepine    never used   REPORTS SUBSTANCE USE: N/A   Barbiturates never used   REPORTS SUBSTANCE USE: N/A   Over the counter meds never used   REPORTS SUBSTANCE USE: N/A   Nicotine  used in the past 19 10/07/20 REPORTS SUBSTANCE USE: N/A   Other substances not listed above:  Identify:  never used   REPORTS SUBSTANCE USE: N/A      Patient reported the following problems as a result of their substance use: no problems, not applicable.     Significant Losses / Trauma / Abuse / Neglect Issues:   Patient did not serve in the .  There are indications or report of significant loss, trauma, abuse or neglect issues related to: are no indications and client denies any losses, trauma, abuse, or neglect concerns. Patient reported he and his wife moved here from [place] three years ago as adults and some of the treatment they have experienced of purchasing things and starting new in the [place] has been difficult.  Concerns for possible neglect are not present.     Medical History:  Patient reports current meds as:        Current Outpatient Medications   Medication Sig     mesalamine (LIALDA) 1.2 g EC tablet TAKE 1 TABLET BY MOUTH THREE TIMES A DAY     pantoprazole (PROTONIX) 40 MG EC tablet Take 40 mg by mouth daily      No current facility-administered medications for this encounter.      Medication Adherence:  Patient reports taking.     Patient Allergies:  No Known Allergies     Medical History:    Other ulcerative colitis without complication     Current Mental Status Exam:   Appearance:  Appropriate    Eye Contact:  Good   Psychomotor:  Normal   Attitude / Demeanor: Cooperative   Speech Rate:  Normal/ Responsive  Volume:  Normal  volume  Language:  intact  Mood:   Normal  Affect:   Appropriate    Thought Content: Clear   Thought Process: Coherent     Associations:  No loosening of associations  Insight:   Good   Judgment:  Intact   Orientation:  All  Attention:  Good    Rating  Scales:  PHQ9:    PHQ-9 SCORE 2/7/2022   PHQ-9 Total Score MyChart 6 (Mild depression)   PHQ-9 Total Score 6   ;    GAD7:    LAURA-7 SCORE 2/7/2022   Total Score 8 (mild anxiety)   Total Score 8       Safety Assessment:   Current Safety Concerns:  [place] Suicide Severity Rating Scale (Lifetime/Recent)  [place] Suicide Severity Rating (Lifetime/Recent) 2/8/2022   1. Wish to be Dead (Lifetime) No   1. Wish to be Dead (Recent) No   2. Non-Specific Active Suicidal Thoughts (Lifetime) No   2. Non-Specific Active Suicidal Thoughts (Recent) No   3. Active Suicidal Ideation with any Methods (Not Plan) Without Intent to Act (Lifetime) No   3. Active Suicidal Ideation with any Methods (Not Plan) Without Intent to Act (Recent) No   4. Active Suicidal Ideation with Some Intent to Act, Without Specific Plan (Lifetime) No   4. Active Suicidal Ideation with Some Intent to Act, Without Specific Plan (Recent) No   5. Active Suicidal Ideation with Specific Plan and Intent (Lifetime) No   5. Active Suicidal Ideation with Specific Plan and Intent (Recent) No   Actual Attempt (Lifetime) No   Actual Attempt (Past 3 Months) No   Has subject engaged in non-suicidal self-injurious behavior? (Lifetime) No   Has subject engaged in non-suicidal self-injurious behavior? (Past 3 Months) No   Interrupted Attempts (Lifetime) No   Interrupted Attempts (Past 3 Months) No   Aborted or Self-Interrupted Attempt (Lifetime) No   Aborted or Self-Interrupted Attempt (Past 3 Months) No   Preparatory Acts or Behavior (Lifetime) No   Preparatory Acts or Behavior (Past 3 Months) No   Patient denies current homicidal ideation and behaviors.  Patient denies current self-injurious ideation and behaviors.    Patient denied risk behaviors associated with substance use.  Patient denies any high risk behaviors associated with mental health symptoms.  Patient reports the following current concerns for their personal safety: None.  Patient reports there are not   firearms in the house.     Clinical Impressions:  Adjustment Disorder  A. The development of emotional or behavioral symptoms in response to an identifiable stressor(s) occurring within 3 months of the onset of the stressor(s)  B. These symptoms or behaviors are clinically significant, as evidenced by one or both of the following:       - Marked distress that is out of proportion to the severity/intensity of the stressor (with consideration for external context & culture)  C. The stress-related disturbance does not meet criteria for another disorder & is not not an exacerbation of another mental disorder  D. The symptoms do not represent normal bereavement  E. Once the stressor or its consequences have terminated, the symptoms do not persist for more than an additional 6 months       * Adjustment Disorder with Mixed Anxiety and Depressed Mood: The predominant manifestation is a combination of depression and anxiety    Therapeutic Interventions Provided: supportive active listening, provided Psychoeducational support and emotional validation     Recommendations/Plan: Outpatient mental health individual therapy services. Patient reported he wants individual therapy at this time.The writer scheduled an individual therapy appointment with the patient. See the appointment details below. The writer put appointment information, along with resources, and her contact information in the patient's MyChart. Provided the patient the appointment information in video chat during the appointment as well.  The writer told the patient to reach out if he wanted any resources or had questions.  Patient verbalized understanding.     Referrals: INDIVIDUAL THERAPY APPOINTMENT SCHEDULED  Date: Friday, 2/18/2022  Time: 11:00 am - 12:00 pm  Provider: Sandhya Vasquez MA  [facility]  Location: [place] [address]  Phone: [phone]  Type: Teletherapy    Jasmina Liu LPC, [facility] [place][note 2]  February  8, 2022  [facility]

## 2022-02-10 ENCOUNTER — TRANSFERRED RECORDS (OUTPATIENT)
Dept: HEALTH INFORMATION MANAGEMENT | Facility: CLINIC | Age: 32
End: 2022-02-10
Payer: COMMERCIAL

## 2022-02-10 LAB — HEP C HIM: NORMAL

## 2022-02-18 ENCOUNTER — TRANSFERRED RECORDS (OUTPATIENT)
Dept: HEALTH INFORMATION MANAGEMENT | Facility: CLINIC | Age: 32
End: 2022-02-18
Payer: COMMERCIAL

## 2022-03-17 ENCOUNTER — TRANSFERRED RECORDS (OUTPATIENT)
Dept: HEALTH INFORMATION MANAGEMENT | Facility: CLINIC | Age: 32
End: 2022-03-17
Payer: COMMERCIAL

## 2022-03-17 LAB
ALT SERPL-CCNC: 13 LU/L (ref 0–44)
AST SERPL-CCNC: 15 LU/L (ref 0–40)
CREATININE (EXTERNAL): 0.97 MG/DL (ref 0.76–1.27)
GFR ESTIMATED (EXTERNAL): 106 ML/MIN/1.73
GLUCOSE (EXTERNAL): 106 MG/DL (ref 65–99)
POTASSIUM (EXTERNAL): 3.8 MMOL/L (ref 3.5–5.2)

## 2022-04-22 ENCOUNTER — TRANSFERRED RECORDS (OUTPATIENT)
Dept: HEALTH INFORMATION MANAGEMENT | Facility: CLINIC | Age: 32
End: 2022-04-22
Payer: COMMERCIAL

## 2022-06-23 ENCOUNTER — TRANSFERRED RECORDS (OUTPATIENT)
Dept: FAMILY MEDICINE | Facility: CLINIC | Age: 32
End: 2022-06-23

## 2022-06-24 ENCOUNTER — OFFICE VISIT (OUTPATIENT)
Dept: ORTHOPEDICS | Facility: CLINIC | Age: 32
End: 2022-06-24
Payer: COMMERCIAL

## 2022-06-24 VITALS
BODY MASS INDEX: 30.31 KG/M2 | DIASTOLIC BLOOD PRESSURE: 72 MMHG | HEIGHT: 68 IN | WEIGHT: 200 LBS | SYSTOLIC BLOOD PRESSURE: 116 MMHG

## 2022-06-24 DIAGNOSIS — M25.572 ACUTE LEFT ANKLE PAIN: Primary | ICD-10-CM

## 2022-06-24 DIAGNOSIS — M21.41 PES PLANUS OF BOTH FEET: ICD-10-CM

## 2022-06-24 DIAGNOSIS — M21.42 PES PLANUS OF BOTH FEET: ICD-10-CM

## 2022-06-24 DIAGNOSIS — S93.491S SPRAIN OF ANTERIOR TALOFIBULAR LIGAMENT, RIGHT, SEQUELA: ICD-10-CM

## 2022-06-24 DIAGNOSIS — M25.571 CHRONIC PAIN OF RIGHT ANKLE: ICD-10-CM

## 2022-06-24 DIAGNOSIS — S93.492A SPRAIN OF ANTERIOR TALOFIBULAR LIGAMENT OF LEFT ANKLE, INITIAL ENCOUNTER: ICD-10-CM

## 2022-06-24 DIAGNOSIS — G89.29 CHRONIC PAIN OF RIGHT ANKLE: ICD-10-CM

## 2022-06-24 PROCEDURE — 99204 OFFICE O/P NEW MOD 45 MIN: CPT | Performed by: STUDENT IN AN ORGANIZED HEALTH CARE EDUCATION/TRAINING PROGRAM

## 2022-06-24 NOTE — PROGRESS NOTES
ASSESSMENT & PLAN    1. Acute left ankle pain    2. Acute sprain of anterior talofibular ligament of left ankle, initial encounter    3. Chronic pain of right ankle    4.  5. Sprain of anterior talofibular ligament, right, sequela   Pes planus (flat feet)     Mao Patel is a 32 year old male with history of ulcerative colitis presenting for evaluation of acute left inversion ankle injury occurring 3 weeks ago, as well as a distant right ankle injury. History, exam and weightbearing XR findings were reviewed today. Evaluation is consistent with low grade ATFL sprain in the setting of bilateral pes planus (flat feet) and posterior chain tightness. Evaluation of the right ankle is consistent with old avulsion fracture of the distal fibula and early degenerative changes. Acute inversion sprain is healing nicely with time and rest. Minimal pain and swelling on exam today. We reviewed plan inclusive of pain and swelling control (nsaids, ice, compression, elevation), arch supports, bracing, modified activity and physical therapy. Also reviewed timing and indications for advanced imaging (MRI) if symptoms persist.    Detailed plan as follows:  - I would recommend consistent use of supportive shoes and arch supports for pes planus. SuperFeet arch supports provided.   - Activity modifications: Avoid painful activities. Physical therapy to assist with return to athletics as strength and mobility improve.    - Please begin range of motion exercises by spelling the alphabet with your ankles/toes frequently throughout the day.    - Referral placed to begin formal physical therapy to work on ankle mobility, strengthening, neuromuscular control/proprioceptive drills and soft tissue modalities for both ankles.      Please schedule a follow up appointment to see me in 4 weeks, or sooner as needed for persistence or worsening of pain. You may call our direct clinic number ([phone]) at any time with questions or  concerns.    Eloise Mcmullen MD, CAM  [facility]    -----    SUBJECTIVE  Mao Patel is a/an 32 year old male who is seen as a self referral for evaluation of left ankle pain. The patient is seen by themselves.    Onset: 3 week(s) ago. Patient describes an inversion ankle injury. Pain in left ankle has improved since that time.  Location of Pain: left lateral ankle - initially had pain in left medial ankle but this has improved  Rating of Pain at worst: 5/10  Rating of Pain Currently: 3/10  Worsened by: weight bearing (initially), inversion  Better with: rest / activity avoidance  Treatments tried: rest/activity avoidance, ice, ibuprofen and compression  Associated symptoms: decreased ROM, cracking  Orthopedic history: YES - patient also reports pain in right ankle that returned after recent left ankle injury. Patient reports h/o right ankle injury ~ 15 years ago. He reports pain on and off since that time. A few years ago he noticed decreased ROM (dorsiflexion) when compared to his left ankle while weightlifting and doing deep squats.  Relevant surgical history: NO  Social history: works - office / desk job, enjoys working out    Past Medical History:   Diagnosis Date     NO ACTIVE PROBLEMS      Social History     Socioeconomic History     Marital status:    Tobacco Use     Smoking status: Never Smoker     Smokeless tobacco: Never Used   Substance and Sexual Activity     Alcohol use: Not Currently     Comment: Once a week during weekend.     Drug use: Never     Sexual activity: Yes     Partners: Female     Birth control/protection: Condom   Other Topics Concern     Parent/sibling w/ CABG, MI or angioplasty before 65F 55M? No          Patient's past medical, surgical, social, and family histories were reviewed today and no changes are noted.    REVIEW OF SYSTEMS:  10 point ROS is negative other than symptoms noted above in HPI, Past Medical History or as stated  "below  Constitutional: NEGATIVE for fever, chills, change in weight  Skin: NEGATIVE for worrisome rashes, moles or lesions  GI/: NEGATIVE for bowel or bladder changes  Neuro: NEGATIVE for weakness, dizziness or paresthesias    OBJECTIVE:  /72   Ht 1.727 m (5' 8\")   Wt 90.7 kg (200 lb)   BMI 30.41 kg/m     General: healthy, alert and in no distress  HEENT: no scleral icterus or conjunctival erythema  Skin: no suspicious lesions or rash. No jaundice.  CV:  no pedal edema  Resp: normal respiratory effort without conversational dyspnea   Psych: normal mood and affect  Gait: antlagic gait with appropriate coordination and balance  Neuro: Normal light sensory exam of lower extremity  MSK:  BILATERAL FOOT & ANKLE  Inspection:    Bilateral pes planus noted with hindfoot valgus. Minimal left lateral ankle swelling without bruising or ecchymosis observed. No right ankle swelling.   Palpation:    Minimal TTP about the left ATFL and posterior tibialis insertion at the navicular. No TTP about the right ankle. Remainder of bony and ligamentous landmarks are nontender.  Range of Motion:     Plantarflexion within normal limits / dorsiflexion limited slightly by tightness / inversion within normal limits / eversion within normal limits  Strength:    Plantarflexion 5/5 / dorsiflexion 5/5 / inversion 5/5 / eversion 5/5  Special Tests:    Slight laxity of L anterior drawer, negative talar tilt, negative valgus stress, negative forced external rotation/eversion, negative Blandon sign, negative squeeze test. Able to perform heel raise with +decreased proprioception and balance bilaterally.     Independent review of the below imaging:  Recent Results (from the past 24 hour(s))   XR Ankle Bilateral G/E 3 vw    Narrative    ANKLE BILATERAL THREE OR MORE VIEWS     6/24/2022 1:18 PM     HISTORY: Acute left ankle pain. Chronic pain of right ankle.    COMPARISON: None.      Impression    IMPRESSION:   Right ankle: Old avulsion " fracture adjacent to the lateral malleolus.  No evidence of acute fracture. Spurring anterior tibiotalar joint.    Left ankle: No evidence of fracture. Mortise and talar dome are  intact.         Eloise Mcmullen MD, CAM  [facility]

## 2022-06-24 NOTE — PATIENT INSTRUCTIONS
1. Acute left ankle pain    2. Acute sprain of anterior talofibular ligament of left ankle, initial encounter    3. Chronic pain of right ankle    4. Sprain of anterior talofibular ligament, right, sequela    5. Pes planus of both feet      Mao Patel is a 32 year old male with history of ulcerative colitis presenting for evaluation of acute left inversion ankle injury occurring 3 weeks ago, as well as a distant right ankle injury. History, exam and weightbearing XR findings were reviewed today. Evaluation is consistent with low grade ATFL sprain in the setting of bilateral pes planus (flat feet) and posterior chain tightness. We reviewed plan inclusive of pain and swelling control (nsaids, ice, compression, elevation), arch supports, bracing, modified activity and physical therapy. Also reviewed timing and indications for advanced imaging (MRI) if symptoms persist.    Detailed plan as follows:  - I would recommend consistent use of supportive shoes and arch supports. SuperFeet arch supports provided.   - Activity modifications: Avoid painful activities. Physical therapy to assist with return to athletics as strength and mobility improve.    - Please begin range of motion exercises by spelling the alphabet with your ankles/toes frequently throughout the day.    - Referral placed to begin formal physical therapy to work on ankle mobility, strengthening, neuromuscular control/proprioceptive drills and soft tissue modalities for both ankles.      Please schedule a follow up appointment to see me in 4 weeks, or sooner as needed for persistence or worsening of pain. You may call our direct clinic number ([phone]) at any time with questions or concerns.    Eloise Mcmullen MD, [facility]

## 2022-06-24 NOTE — LETTER
6/24/2022         RE: Mao Patel  [address] [facility] [address]        Dear Colleague,    Thank you for referring your patient, Mao Patle, to the [facility] [place]. Please see a copy of my visit note below.    ASSESSMENT & PLAN    1. Acute left ankle pain    2. Acute sprain of anterior talofibular ligament of left ankle, initial encounter    3. Chronic pain of right ankle    4.  5. Sprain of anterior talofibular ligament, right, sequela   Pes planus (flat feet)     Mao Patel is a 32 year old male with history of ulcerative colitis presenting for evaluation of acute left inversion ankle injury occurring 3 weeks ago, as well as a distant right ankle injury. History, exam and weightbearing XR findings were reviewed today. Evaluation is consistent with low grade ATFL sprain in the setting of bilateral pes planus (flat feet) and posterior chain tightness. Evaluation of the right ankle is consistent with old avulsion fracture of the distal fibula and early degenerative changes. Acute inversion sprain is healing nicely with time and rest. Minimal pain and swelling on exam today. We reviewed plan inclusive of pain and swelling control (nsaids, ice, compression, elevation), arch supports, bracing, modified activity and physical therapy. Also reviewed timing and indications for advanced imaging (MRI) if symptoms persist.    Detailed plan as follows:  - I would recommend consistent use of supportive shoes and arch supports for pes planus. SuperFeet arch supports provided.   - Activity modifications: Avoid painful activities. Physical therapy to assist with return to athletics as strength and mobility improve.    - Please begin range of motion exercises by spelling the alphabet with your ankles/toes frequently throughout the day.    - Referral placed to begin formal physical therapy to work on ankle mobility, strengthening, neuromuscular  control/proprioceptive drills and soft tissue modalities for both ankles.      Please schedule a follow up appointment to see me in 4 weeks, or sooner as needed for persistence or worsening of pain. You may call our direct clinic number ([phone]) at any time with questions or concerns.    Eloise Mcmullen MD, [facility]    -----    SUBJECTIVE  Mao Patel is a/an 32 year old male who is seen as a self referral for evaluation of left ankle pain. The patient is seen by themselves.    Onset: 3 week(s) ago. Patient describes an inversion ankle injury. Pain in left ankle has improved since that time.  Location of Pain: left lateral ankle - initially had pain in left medial ankle but this has improved  Rating of Pain at worst: 5/10  Rating of Pain Currently: 3/10  Worsened by: weight bearing (initially), inversion  Better with: rest / activity avoidance  Treatments tried: rest/activity avoidance, ice, ibuprofen and compression  Associated symptoms: decreased ROM, cracking  Orthopedic history: YES - patient also reports pain in right ankle that returned after recent left ankle injury. Patient reports h/o right ankle injury ~ 15 years ago. He reports pain on and off since that time. A few years ago he noticed decreased ROM (dorsiflexion) when compared to his left ankle while weightlifting and doing deep squats.  Relevant surgical history: NO  Social history: works - office / desk job, enjoys working out    Past Medical History:   Diagnosis Date     NO ACTIVE PROBLEMS      Social History     Socioeconomic History     Marital status:    Tobacco Use     Smoking status: Never Smoker     Smokeless tobacco: Never Used   Substance and Sexual Activity     Alcohol use: Not Currently     Comment: Once a week during weekend.     Drug use: Never     Sexual activity: Yes     Partners: Female     Birth control/protection: Condom   Other Topics Concern     Parent/sibling w/  "CABG, MI or angioplasty before 65F 55M? No          Patient's past medical, surgical, social, and family histories were reviewed today and no changes are noted.    REVIEW OF SYSTEMS:  10 point ROS is negative other than symptoms noted above in HPI, Past Medical History or as stated below  Constitutional: NEGATIVE for fever, chills, change in weight  Skin: NEGATIVE for worrisome rashes, moles or lesions  GI/: NEGATIVE for bowel or bladder changes  Neuro: NEGATIVE for weakness, dizziness or paresthesias    OBJECTIVE:  /72   Ht 1.727 m (5' 8\")   Wt 90.7 kg (200 lb)   BMI 30.41 kg/m     General: healthy, alert and in no distress  HEENT: no scleral icterus or conjunctival erythema  Skin: no suspicious lesions or rash. No jaundice.  CV:  no pedal edema  Resp: normal respiratory effort without conversational dyspnea   Psych: normal mood and affect  Gait: antlagic gait with appropriate coordination and balance  Neuro: Normal light sensory exam of lower extremity  MSK:  BILATERAL FOOT & ANKLE  Inspection:    Bilateral pes planus noted with hindfoot valgus. Minimal left lateral ankle swelling without bruising or ecchymosis observed. No right ankle swelling.   Palpation:    Minimal TTP about the left ATFL and posterior tibialis insertion at the navicular. No TTP about the right ankle. Remainder of bony and ligamentous landmarks are nontender.  Range of Motion:     Plantarflexion within normal limits / dorsiflexion limited slightly by tightness / inversion within normal limits / eversion within normal limits  Strength:    Plantarflexion 5/5 / dorsiflexion 5/5 / inversion 5/5 / eversion 5/5  Special Tests:    Slight laxity of L anterior drawer, negative talar tilt, negative valgus stress, negative forced external rotation/eversion, negative Blandon sign, negative squeeze test. Able to perform heel raise with +decreased proprioception and balance bilaterally.     Independent review of the below imaging:  Recent " Results (from the past 24 hour(s))   XR Ankle Bilateral G/E 3 vw    Narrative    ANKLE BILATERAL THREE OR MORE VIEWS     6/24/2022 1:18 PM     HISTORY: Acute left ankle pain. Chronic pain of right ankle.    COMPARISON: None.      Impression    IMPRESSION:   Right ankle: Old avulsion fracture adjacent to the lateral malleolus.  No evidence of acute fracture. Spurring anterior tibiotalar joint.    Left ankle: No evidence of fracture. Mortise and talar dome are  intact.         Eloise Mcmullen MD, [facility]        Again, thank you for allowing me to participate in the care of your patient.        Sincerely,        Eloise Mcmullen MD

## 2022-07-06 ENCOUNTER — THERAPY VISIT (OUTPATIENT)
Dept: PHYSICAL THERAPY | Facility: CLINIC | Age: 32
End: 2022-07-06
Attending: STUDENT IN AN ORGANIZED HEALTH CARE EDUCATION/TRAINING PROGRAM
Payer: COMMERCIAL

## 2022-07-06 DIAGNOSIS — S93.401A SPRAIN OF RIGHT ANKLE: ICD-10-CM

## 2022-07-06 DIAGNOSIS — M25.572 ACUTE LEFT ANKLE PAIN: ICD-10-CM

## 2022-07-06 DIAGNOSIS — M25.572 PAIN IN JOINT INVOLVING ANKLE AND FOOT, LEFT: ICD-10-CM

## 2022-07-06 DIAGNOSIS — S93.491S SPRAIN OF ANTERIOR TALOFIBULAR LIGAMENT, RIGHT, SEQUELA: ICD-10-CM

## 2022-07-06 DIAGNOSIS — G89.29 CHRONIC PAIN OF RIGHT ANKLE: ICD-10-CM

## 2022-07-06 DIAGNOSIS — M21.41 PES PLANUS OF BOTH FEET: ICD-10-CM

## 2022-07-06 DIAGNOSIS — M25.571 CHRONIC PAIN OF RIGHT ANKLE: ICD-10-CM

## 2022-07-06 DIAGNOSIS — S93.492A SPRAIN OF ANTERIOR TALOFIBULAR LIGAMENT OF LEFT ANKLE, INITIAL ENCOUNTER: ICD-10-CM

## 2022-07-06 DIAGNOSIS — M21.42 PES PLANUS OF BOTH FEET: ICD-10-CM

## 2022-07-06 PROCEDURE — 97112 NEUROMUSCULAR REEDUCATION: CPT | Mod: GP | Performed by: PHYSICAL THERAPIST

## 2022-07-06 PROCEDURE — 97161 PT EVAL LOW COMPLEX 20 MIN: CPT | Mod: GP | Performed by: PHYSICAL THERAPIST

## 2022-07-06 NOTE — PROGRESS NOTES
Physical Therapy Initial Evaluation  Subjective:  The history is provided by the patient.   Patient Health History  Mao Patel being seen for Twisted ankle and flat feet.     Problem began: 6/1/2022.      Pain is reported as 0/10 and 1/10 on pain scale.  General health as reported by patient is good.  Pertinent medical history includes: none.     Medical allergies: none.   Surgeries include:  None.    Current medications:  Anti-inflammatory.       Primary job tasks include:  Computer work.                  Therapist Generated HPI Evaluation  Problem details: Patients main concern is wanting to be able to return to walking unlimited and to start a running/jogging program and be able to lift weights, lunges without limitation.  He injured his left ankle in early June (sprain).  He describes injuring his right ankle @ 15 years ago and still notices decreases motion.   .         Type of problem:  Bilateral ankles.    This is a new condition.  Condition occurred with:  A fall/slip and a twist.  Where condition occurred: at home.  Patient reports pain:  Lateral and medial.  and is intermittent.      Associated symptoms:  Loss of strength and loss of motion/stiffness. Symptoms are exacerbated by walking and descending stairs    Special tests included:  X-ray.    Barriers include:  None as reported by patient.                        Objective:    Gait:    Gait Type:  Normal   Weight Bearing Status:  WBAT   Assistive Devices:  None            Ankle/Foot Evaluation  ROM:    AROM:    Dorsiflexion:  Left:   18  Right:   15  Plantarflexion:  Left:  60    Right:  60  Inversion:  Left:  Wnl     Right:  Wnl  Eversion:  Wnl     Right:  Wnl        Strength wnl ankle: able to do 20 toe raises c u/e support.      PALPATION:   Left ankle tenderness present at:  calcaneofibular ligament  Left ankle tenderness not present at:   anterior talofibular ligament    EDEMA: normal          MOBILITY TESTING:         Subtalar Left:  "hypomobile          FUNCTIONAL TESTS:         Quad:  Single Leg Squat Left: Control is mild loss of control.  Single Leg Squat Right: Control is mild loss of control  Bilateral Leg Squat:  Control is normal control                                                            General Evaluation:                      Balance:  Balance wnl general: significant difficulty with SLS and tandem stance with EC versus EO.    Single Leg Stance--Eyes Open:  Left: 1 TD's/30 sec    Right: 1 TD/30 sec  Single Leg Stance--Eyes Closed:  Left: 1 TD/30 sec    Right: 4-5 TD's/30 sec          Functional Assessment:  Functional assessment wnl: pt able to tolerate 10\" of balance assessment , training, states pain-free after.                                                 ROS    Assessment/Plan:    Patient is a 32 year old male with both sides ankle complaints.    Patient has the following significant findings with corresponding treatment plan.                Diagnosis 1:  Left ankle sprain (@ 5 weeks ago) and hx of right ankle sprain, injury.    Decreased ROM/flexibility - therapeutic exercise, therapeutic activity and home program  Decreased joint mobility - manual therapy, therapeutic exercise and home program  Decreased strength - therapeutic exercise, therapeutic activities and home program  Impaired balance - neuro re-education, therapeutic activities and home program  Decreased proprioception - neuro re-education, therapeutic activities and home program  Decreased function - therapeutic activities and home program    Therapy Evaluation Codes:   1) Clinical presentation characteristics are:   Stable/Uncomplicated.  2) Decision-Making    Low complexity using standardized patient assessment instrument and/or measureable assessment of functional outcome.  Cumulative Therapy Evaluation is: Low complexity.    Previous and current functional limitations:  (See Goal Flow Sheet for this information)    Short term and Long term goals: (See " Goal Flow Sheet for this information)     Communication ability:  Patient appears to be able to clearly communicate and understand verbal and written communication and follow directions correctly.  Treatment Explanation - The following has been discussed with the patient:   RX ordered/plan of care  Anticipated outcomes  Possible risks and side effects  This patient would benefit from PT intervention to resume normal activities.   Rehab potential is good.    Frequency:  1 X week, once daily  Duration:  for 4 weeks  Discharge Plan:  Achieve all LTG.  Independent in home treatment program.  Reach maximal therapeutic benefit.    Please refer to the daily flowsheet for treatment today, total treatment time and time spent performing 1:1 timed codes.

## 2022-07-13 ENCOUNTER — THERAPY VISIT (OUTPATIENT)
Dept: PHYSICAL THERAPY | Facility: CLINIC | Age: 32
End: 2022-07-13
Payer: COMMERCIAL

## 2022-07-13 DIAGNOSIS — M25.572 PAIN IN JOINT INVOLVING ANKLE AND FOOT, LEFT: Primary | ICD-10-CM

## 2022-07-13 DIAGNOSIS — S93.401A SPRAIN OF RIGHT ANKLE: ICD-10-CM

## 2022-07-13 PROCEDURE — 97110 THERAPEUTIC EXERCISES: CPT | Mod: GP | Performed by: PHYSICAL THERAPIST

## 2022-07-13 PROCEDURE — 97112 NEUROMUSCULAR REEDUCATION: CPT | Mod: GP | Performed by: PHYSICAL THERAPIST

## 2022-07-20 ENCOUNTER — THERAPY VISIT (OUTPATIENT)
Dept: PHYSICAL THERAPY | Facility: CLINIC | Age: 32
End: 2022-07-20
Payer: COMMERCIAL

## 2022-07-20 DIAGNOSIS — S93.401A SPRAIN OF RIGHT ANKLE: ICD-10-CM

## 2022-07-20 DIAGNOSIS — M25.572 PAIN IN JOINT INVOLVING ANKLE AND FOOT, LEFT: Primary | ICD-10-CM

## 2022-07-20 PROCEDURE — 97140 MANUAL THERAPY 1/> REGIONS: CPT | Mod: GP | Performed by: PHYSICAL THERAPIST

## 2022-07-20 PROCEDURE — 97110 THERAPEUTIC EXERCISES: CPT | Mod: GP | Performed by: PHYSICAL THERAPIST

## 2022-07-20 PROCEDURE — 97112 NEUROMUSCULAR REEDUCATION: CPT | Mod: GP | Performed by: PHYSICAL THERAPIST

## 2022-07-27 ENCOUNTER — OFFICE VISIT (OUTPATIENT)
Dept: ORTHOPEDICS | Facility: CLINIC | Age: 32
End: 2022-07-27

## 2022-07-27 ENCOUNTER — THERAPY VISIT (OUTPATIENT)
Dept: PHYSICAL THERAPY | Facility: CLINIC | Age: 32
End: 2022-07-27
Payer: COMMERCIAL

## 2022-07-27 VITALS
HEIGHT: 68 IN | WEIGHT: 200 LBS | BODY MASS INDEX: 30.31 KG/M2 | SYSTOLIC BLOOD PRESSURE: 108 MMHG | DIASTOLIC BLOOD PRESSURE: 60 MMHG

## 2022-07-27 DIAGNOSIS — S93.492D SPRAIN OF ANTERIOR TALOFIBULAR LIGAMENT OF LEFT ANKLE, SUBSEQUENT ENCOUNTER: ICD-10-CM

## 2022-07-27 DIAGNOSIS — S93.401A SPRAIN OF RIGHT ANKLE: ICD-10-CM

## 2022-07-27 DIAGNOSIS — M21.41 PES PLANUS OF BOTH FEET: ICD-10-CM

## 2022-07-27 DIAGNOSIS — M21.42 PES PLANUS OF BOTH FEET: ICD-10-CM

## 2022-07-27 DIAGNOSIS — M25.571 CHRONIC PAIN OF RIGHT ANKLE: ICD-10-CM

## 2022-07-27 DIAGNOSIS — M25.572 PAIN IN JOINT INVOLVING ANKLE AND FOOT, LEFT: Primary | ICD-10-CM

## 2022-07-27 DIAGNOSIS — S93.491S SPRAIN OF ANTERIOR TALOFIBULAR LIGAMENT, RIGHT, SEQUELA: ICD-10-CM

## 2022-07-27 DIAGNOSIS — M25.572 ACUTE LEFT ANKLE PAIN: Primary | ICD-10-CM

## 2022-07-27 DIAGNOSIS — G89.29 CHRONIC PAIN OF RIGHT ANKLE: ICD-10-CM

## 2022-07-27 PROCEDURE — 97112 NEUROMUSCULAR REEDUCATION: CPT | Mod: GP | Performed by: PHYSICAL THERAPIST

## 2022-07-27 PROCEDURE — 97110 THERAPEUTIC EXERCISES: CPT | Mod: GP | Performed by: PHYSICAL THERAPIST

## 2022-07-27 PROCEDURE — 99213 OFFICE O/P EST LOW 20 MIN: CPT | Performed by: STUDENT IN AN ORGANIZED HEALTH CARE EDUCATION/TRAINING PROGRAM

## 2022-07-27 PROCEDURE — 97140 MANUAL THERAPY 1/> REGIONS: CPT | Mod: GP | Performed by: PHYSICAL THERAPIST

## 2022-07-27 NOTE — PROGRESS NOTES
"ASSESSMENT & PLAN  Patient Instructions     1. Acute left ankle pain    2. Sprain of anterior talofibular ligament of left ankle, subsequent encounter    3. Chronic pain of right ankle    4. Sprain of anterior talofibular ligament, right, sequela    5. Pes planus of both feet      Presenting for follow up of acute left ankle sprain and chronic pain of right ankle due to prior sprain.   - Making excellent improvement with PT. Continuing to work on right ankle mobility.   - Ready to gradually advance activities as tolerated based on pain.  - Heat versus ice as needed for comfort.  - Recommend ongoing arch supports. May hold off on adding support to work boot as these seem to have an adequate arch.     Return to clinic as needed for persistence or worsening of pain. You may call our direct clinic number ([phone]) at any time with questions or concerns.    Eloise Mcmullen MD, [facility]    -----    SUBJECTIVE:  Mao Patel is a 32 year old male who is seen in follow-up for bilateral ankle pain. They were last seen 6/24/2022.     Since their last visit reports 85-90% improvement. Patient still reports pain with forced inversion / stretching. They indicate that their current pain level is 0/10. They have tried rest/activity avoidance, SuperFeet shoe inserts, physical therapy (4 visits), home exercises, xray 6/24/22.      The patient is seen by themselves.    Patient's past medical, surgical, social, and family histories were reviewed today and no changes are noted.    REVIEW OF SYSTEMS:  Constitutional: NEGATIVE for fever, chills, change in weight  Skin: NEGATIVE for worrisome rashes, moles or lesions  GI/: NEGATIVE for bowel or bladder changes  Neuro: NEGATIVE for weakness, dizziness or paresthesias    OBJECTIVE:  /60   Ht 1.727 m (5' 8\")   Wt 90.7 kg (200 lb)   BMI 30.41 kg/m     General: healthy, alert and in no distress  HEENT: no scleral icterus or " conjunctival erythema  Skin: no suspicious lesions or rash. No jaundice.  CV: regular rhythm by palpation, no pedal edema  Resp: normal respiratory effort without conversational dyspnea   Psych: normal mood and affect  Gait: normal steady gait with appropriate coordination and balance  Neuro: normal light touch sensory exam of the extremities.    MSK:  BILATERAL FOOT & ANKLE  Inspection:    Bilateral pes planus noted with hindfoot valgus. No swelling, bruising, deformity.  Palpation:    Bony and ligamentous landmarks are nontender.  Range of Motion:     Plantarflexion within normal limits / dorsiflexion limited slightly by tightness (R) / inversion within normal limits / eversion within normal limits  Strength:    Plantarflexion 5/5 / dorsiflexion 5/5 / inversion 5/5 / eversion 5/5  Special Tests:    Negative bilateral anterior drawer, negative talar tilt, negative valgus stress, negative forced external rotation/eversion, negative Blandon sign, negative squeeze test. Able to perform heel raise with improved proprioception and balance.     Independent visualization of the below image:  Results for orders placed or performed in visit on 06/24/22   XR Ankle Bilateral G/E 3 vw    Narrative    ANKLE BILATERAL THREE OR MORE VIEWS     6/24/2022 1:18 PM     HISTORY: Acute left ankle pain. Chronic pain of right ankle.    COMPARISON: None.      Impression    IMPRESSION:   Right ankle: Old avulsion fracture adjacent to the lateral malleolus.  No evidence of acute fracture. Spurring anterior tibiotalar joint.    Left ankle: No evidence of fracture. Mortise and talar dome are  intact.    MD Eloise FRAZIER MD, [facility]

## 2022-07-27 NOTE — LETTER
7/27/2022         RE: Mao Patel  [address] [facility] [address]        Dear Colleague,    Thank you for referring your patient, Mao Patel, to the [facility] [place]. Please see a copy of my visit note below.    ASSESSMENT & PLAN  Patient Instructions     1. Acute left ankle pain    2. Sprain of anterior talofibular ligament of left ankle, subsequent encounter    3. Chronic pain of right ankle    4. Sprain of anterior talofibular ligament, right, sequela    5. Pes planus of both feet      Presenting for follow up of acute left ankle sprain and chronic pain of right ankle due to prior sprain.   - Making excellent improvement with PT. Continuing to work on right ankle mobility.   - Ready to gradually advance activities as tolerated based on pain.  - Heat versus ice as needed for comfort.  - Recommend ongoing arch supports. May hold off on adding support to work boot as these seem to have an adequate arch.     Return to clinic as needed for persistence or worsening of pain. You may call our direct clinic number ([phone]) at any time with questions or concerns.    Eloise Mcmullen MD, [facility]    -----    SUBJECTIVE:  Mao Patel is a 32 year old male who is seen in follow-up for bilateral ankle pain. They were last seen 6/24/2022.     Since their last visit reports 85-90% improvement. Patient still reports pain with forced inversion / stretching. They indicate that their current pain level is 0/10. They have tried rest/activity avoidance, SuperFeet shoe inserts, physical therapy (4 visits), home exercises, xray 6/24/22.      The patient is seen by themselves.    Patient's past medical, surgical, social, and family histories were reviewed today and no changes are noted.    REVIEW OF SYSTEMS:  Constitutional: NEGATIVE for fever, chills, change in weight  Skin: NEGATIVE for worrisome rashes, moles or  "lesions  GI/: NEGATIVE for bowel or bladder changes  Neuro: NEGATIVE for weakness, dizziness or paresthesias    OBJECTIVE:  /60   Ht 1.727 m (5' 8\")   Wt 90.7 kg (200 lb)   BMI 30.41 kg/m     General: healthy, alert and in no distress  HEENT: no scleral icterus or conjunctival erythema  Skin: no suspicious lesions or rash. No jaundice.  CV: regular rhythm by palpation, no pedal edema  Resp: normal respiratory effort without conversational dyspnea   Psych: normal mood and affect  Gait: normal steady gait with appropriate coordination and balance  Neuro: normal light touch sensory exam of the extremities.    MSK:  BILATERAL FOOT & ANKLE  Inspection:    Bilateral pes planus noted with hindfoot valgus. No swelling, bruising, deformity.  Palpation:    Bony and ligamentous landmarks are nontender.  Range of Motion:     Plantarflexion within normal limits / dorsiflexion limited slightly by tightness (R) / inversion within normal limits / eversion within normal limits  Strength:    Plantarflexion 5/5 / dorsiflexion 5/5 / inversion 5/5 / eversion 5/5  Special Tests:    Negative bilateral anterior drawer, negative talar tilt, negative valgus stress, negative forced external rotation/eversion, negative Blandon sign, negative squeeze test. Able to perform heel raise with improved proprioception and balance.     Independent visualization of the below image:  Results for orders placed or performed in visit on 06/24/22   XR Ankle Bilateral G/E 3 vw    Narrative    ANKLE BILATERAL THREE OR MORE VIEWS     6/24/2022 1:18 PM     HISTORY: Acute left ankle pain. Chronic pain of right ankle.    COMPARISON: None.      Impression    IMPRESSION:   Right ankle: Old avulsion fracture adjacent to the lateral malleolus.  No evidence of acute fracture. Spurring anterior tibiotalar joint.    Left ankle: No evidence of fracture. Mortise and talar dome are  intact.    MD Eloise FRAZIER MD, " [facility]  [facility]              Again, thank you for allowing me to participate in the care of your patient.        Sincerely,        Eloise Mcmullen MD

## 2022-07-27 NOTE — PATIENT INSTRUCTIONS
1. Acute left ankle pain    2. Sprain of anterior talofibular ligament of left ankle, subsequent encounter    3. Chronic pain of right ankle    4. Sprain of anterior talofibular ligament, right, sequela    5. Pes planus of both feet      Presenting for follow up of acute left ankle sprain and chronic pain of right ankle due to prior sprain.   - Making excellent improvement with PT. Continuing to work on right ankle mobility.   - Ready to gradually advance activities as tolerated based on pain.  - Heat versus ice as needed for comfort.  - Recommend ongoing arch supports. May hold off on adding support to work boot as these seem to have an adequate arch.     Return to clinic as needed for persistence or worsening of pain. You may call our direct clinic number ([phone]) at any time with questions or concerns.    Eloise Mcmullen MD, [facility] Orthopedic [facility]

## 2022-08-10 ENCOUNTER — THERAPY VISIT (OUTPATIENT)
Dept: PHYSICAL THERAPY | Facility: CLINIC | Age: 32
End: 2022-08-10
Payer: COMMERCIAL

## 2022-08-10 DIAGNOSIS — S93.401A SPRAIN OF RIGHT ANKLE: ICD-10-CM

## 2022-08-10 DIAGNOSIS — M25.572 PAIN IN JOINT INVOLVING ANKLE AND FOOT, LEFT: Primary | ICD-10-CM

## 2022-08-10 PROCEDURE — 97110 THERAPEUTIC EXERCISES: CPT | Mod: GP | Performed by: PHYSICAL THERAPIST

## 2022-08-10 PROCEDURE — 97530 THERAPEUTIC ACTIVITIES: CPT | Mod: GP | Performed by: PHYSICAL THERAPIST

## 2022-08-24 ENCOUNTER — THERAPY VISIT (OUTPATIENT)
Dept: PHYSICAL THERAPY | Facility: CLINIC | Age: 32
End: 2022-08-24
Payer: COMMERCIAL

## 2022-08-24 DIAGNOSIS — M25.572 PAIN IN JOINT INVOLVING ANKLE AND FOOT, LEFT: Primary | ICD-10-CM

## 2022-08-24 DIAGNOSIS — S93.401A SPRAIN OF RIGHT ANKLE: ICD-10-CM

## 2022-08-24 PROCEDURE — 97112 NEUROMUSCULAR REEDUCATION: CPT | Mod: GP | Performed by: PHYSICAL THERAPIST

## 2022-08-24 PROCEDURE — 97110 THERAPEUTIC EXERCISES: CPT | Mod: GP | Performed by: PHYSICAL THERAPIST

## 2022-09-18 ENCOUNTER — HEALTH MAINTENANCE LETTER (OUTPATIENT)
Age: 32
End: 2022-09-18

## 2022-09-30 ENCOUNTER — TRANSFERRED RECORDS (OUTPATIENT)
Dept: HEALTH INFORMATION MANAGEMENT | Facility: CLINIC | Age: 32
End: 2022-09-30

## 2022-11-14 NOTE — PROGRESS NOTES
Patient was called regarding referral from Dr. Amaya for SAMUEL.  Patient had last seen Dr. Bah 1/22/16.  Patient requested an appointment with Dr. Marin.  Patient will be gone until mid march.  Patient was scheduled on 3/20/23 arriving at 10:00 a.m.  New patient letter and forms were mailed.     Subjective     Mao Patel is a 29 year old male who presents to clinic today for the following health issues:    HPI   1. Varicose Veins      Duration: unsure when started; but noticed a couple months ago    Description (location/character/radiation): didn't notice until he wore shorts a couple months ago; says they are always there; on left leg they are on his calf and around ankle; also has around right ankle    Intensity:  Mild-moderate    Accompanying signs and symptoms: always has the varicose veins but sometimes they are more mild; when he walks a lot he will have moderate swelling and pain; sometimes feels tired    History (similar episodes/previous evaluation): None    Precipitating or alleviating factors: working out and elevating feet    Therapies tried and outcome: elevating his feet and working out; elevating helps but they get bad again when he puts his feet down; stopping working out makes it worse     Patient is here today for evaluation of varicose veins  Ongoing for a long time, R is worse than left  Notes that any time he is on his feet for awhile, they become painful  Both parents and grandma have varicose veins  He works a desk job  Has tried compression stockings in past without relief    2. Nose Problem  Patient has hx of nasal surgery in 2017 due to deviated septum  Notes that he sometimes has trouble breathing through his nose  Patient thinks the problem is not due to septum, thinks maybe something is blocking his nasal cavity or feels swollen  Sometimes is in right nostril and sometimes in left, tried nasal spray without permanent relief  Has been ongoing intermittently for several years  Surgeon said it would take about 1 year to heal completely but nothing has changed in the last 2 years  Says that he used to have bad congestion when seasonal allergies would flare-up  Now when the seasons change or he gets a cold his nose isn't affected; but he will have a sore throat or swelling in  "his throat or it will affect his ears instead    He also complains today of sore throat  Ongoing for 2 days  No known ill exposure  No fever  No post nasal drip  Taking nothing for this      Patient Active Problem List   Diagnosis     Ulcerative proctitis (H)     Past Surgical History:   Procedure Laterality Date     COLONOSCOPY N/A 6/14/2019    Procedure: COLONOSCOPY, WITH cold biopsy;  Surgeon: Phill Ruiz MD;  Location:  GI     NOSE SURGERY      for deviated septum     ORTHOPEDIC SURGERY         Social History     Tobacco Use     Smoking status: Never Smoker     Smokeless tobacco: Never Used   Substance Use Topics     Alcohol use: Yes     Frequency: 2-4 times a month     Drinks per session: 3 or 4     Binge frequency: Never     Comment: Once a week during weekend.     Family History   Problem Relation Age of Onset     No Known Problems Mother      Other - See Comments Father         cholecystitis     Cancer Maternal Grandfather      Prostate Cancer Maternal Grandfather      No Known Problems Maternal Grandmother      No Known Problems Paternal Grandmother      No Known Problems Paternal Grandfather      No Known Problems Brother      No Known Problems Sister      Colon Cancer No family hx of          Current Outpatient Medications   Medication Sig Dispense Refill     mometasone (NASONEX) 50 MCG/ACT nasal spray Spray 2 sprays into both nostrils daily 17 g 1     order for DME Equipment being ordered: compression stockings 10-20mmHg 1 Units 0     Allergies   Allergen Reactions     Seasonal Allergies          Reviewed and updated as needed this visit by Provider         Review of Systems   ROS COMP: Constitutional, HEENT, cardiovascular, pulmonary, gi and gu systems are negative, except as otherwise noted.      Objective    /64 (BP Location: Right arm, Patient Position: Chair, Cuff Size: Adult Large)   Pulse 88   Temp 98.2  F (36.8  C) (Oral)   Resp 16   Ht 1.772 m (5' 9.75\")   Wt 89.4 kg (197 lb) "   SpO2 97%   BMI 28.47 kg/m    Body mass index is 28.47 kg/m .  Physical Exam   GENERAL: healthy, alert and no distress  EYES: Eyes grossly normal to inspection, PERRL and conjunctivae and sclerae normal  HENT: normal cephalic/atraumatic, ear canals and TM's normal, nose and mouth without ulcers or lesions, oropharynx clear, oral mucous membranes moist, tonsillar hypertrophy and tonsillar erythema  NECK: no adenopathy, no asymmetry, masses, or scars and thyroid normal to palpation  RESP: lungs clear to auscultation - no rales, rhonchi or wheezes  CV: regular rate and rhythm, normal S1 S2, no S3 or S4, no murmur, click or rub, no peripheral edema and peripheral pulses strong  MS: no gross musculoskeletal defects noted, no edema  SKIN: varicosities - lower legs    Diagnostic Test Results:  Results for orders placed or performed in visit on 07/24/19 (from the past 24 hour(s))   Strep, Rapid Screen   Result Value Ref Range    Specimen Description Throat     Rapid Strep A Screen       NEGATIVE: No Group A streptococcal antigen detected by immunoassay, await culture report.           Assessment & Plan     1. Varicose veins of both lower extremities, unspecified whether complicated  Chronic issue, will start with compression stockings however vascular medicine referral also given.  - VASCULAR MEDICINE REFERRAL; Future  - order for DME; Equipment being ordered: compression stockings 10-20mmHg  Dispense: 1 Units; Refill: 0    2. Deviated nasal septum  Chronic issue, rx of Nasonex given, recommend f/u with ENT>  - OTOLARYNGOLOGY REFERRAL  - mometasone (NASONEX) 50 MCG/ACT nasal spray; Spray 2 sprays into both nostrils daily  Dispense: 17 g; Refill: 1    3. Nasal congestion  - mometasone (NASONEX) 50 MCG/ACT nasal spray; Spray 2 sprays into both nostrils daily  Dispense: 17 g; Refill: 1    4. Acute pharyngitis, unspecified etiology  New problem, discussed with patient that illness is likely viral in etiology. Recommend rest,  fluids and over the counter medications.  Advised follow up if symptoms worsen or do not alleviate or improve.    - Strep, Rapid Screen  - Beta strep group A culture     Risks, benefits and alternatives were discussed with patient. Agreeable to the plan of care.      Return in about 4 weeks (around 8/21/2019) for If symptoms worsen or fail to improve.    Sherita Cannon PA-C  [facility]

## 2022-11-23 PROBLEM — M25.572 PAIN IN JOINT INVOLVING ANKLE AND FOOT, LEFT: Status: RESOLVED | Noted: 2022-07-06 | Resolved: 2022-11-23

## 2022-11-23 PROBLEM — S93.401A SPRAIN OF RIGHT ANKLE: Status: RESOLVED | Noted: 2022-07-06 | Resolved: 2022-11-23

## 2022-11-23 NOTE — PROGRESS NOTES
Discharge Note    Progress reporting period is from initial evaluation date (please see noted date below) to Aug 24, 2022.  Linked Episodes   Type: Episode: Status: Noted: Resolved: Last update: Updated by:   PHYSICAL THERAPY andres Active 7/6/2022 8/24/2022  2:30 PM Jabier Preciado, PT      Comments:       Please see information below for last relevant information on current status.  Patient seen for 6 visits.    SUBJECTIVE  Subjective changes noted by patient:  started running and started to have LBP.   Had new running shoes.  .  Current pain level is 0/10.     Previous pain level was  0/10.   Changes in function:  Yes (See Goal flowsheet attached for changes in current functional level)  Adverse reaction to treatment or activity: None    OBJECTIVE  Changes noted in objective findings: Advised to run outside, flatter surfaces instead of treadmill with inclines.     ASSESSMENT/PLAN  Diagnosis: Left ankle sprain, improving   Updated problem list and treatment plan:   Decreased function - HEP  Decreased strength - HEP  Impaired balance - HEP  Decreased proprioception - HEP  STG/LTGs have been met or progress has been made towards goals:  Yes, please see goal flowsheet for most current information  Assessment of Progress: current status is unknown.    Last current status:     Self Management Plans:  HEP  I have re-evaluated this patient and find that the nature, scope, duration and intensity of the therapy is appropriate for the medical condition of the patient.  Mao continues to require the following intervention to meet STG and LTG's:  HEP.    Recommendations:  Discharge with current home program.  Patient to follow up with MD as needed.    Please refer to the daily flowsheet for treatment today, total treatment time and time spent performing 1:1 timed codes.

## 2023-03-09 ENCOUNTER — TRANSFERRED RECORDS (OUTPATIENT)
Dept: HEALTH INFORMATION MANAGEMENT | Facility: CLINIC | Age: 33
End: 2023-03-09

## 2023-03-10 ENCOUNTER — TRANSFERRED RECORDS (OUTPATIENT)
Dept: HEALTH INFORMATION MANAGEMENT | Facility: CLINIC | Age: 33
End: 2023-03-10

## 2023-03-10 LAB
ALT SERPL-CCNC: 9 IU/L (ref 0–44)
AST SERPL-CCNC: 15 IU/L (ref 0–40)
CREATININE (EXTERNAL): 0.82 MG/DL (ref 0.76–1.27)
GFR ESTIMATED (EXTERNAL): 119 ML/MIN/1.73
GLUCOSE (EXTERNAL): 94 MG/DL (ref 70–99)
POTASSIUM (EXTERNAL): 4.1 MMOL/L (ref 3.5–5.2)

## 2023-05-07 ENCOUNTER — HEALTH MAINTENANCE LETTER (OUTPATIENT)
Age: 33
End: 2023-05-07

## 2023-06-09 ASSESSMENT — ENCOUNTER SYMPTOMS
HEMATURIA: 0
FEVER: 0
WEAKNESS: 0
DYSURIA: 0
COUGH: 0
MYALGIAS: 0
JOINT SWELLING: 0
HEMATOCHEZIA: 0
CONSTIPATION: 0
EYE PAIN: 0
HEADACHES: 0
FREQUENCY: 0
SORE THROAT: 0
PARESTHESIAS: 0
ARTHRALGIAS: 0
NAUSEA: 0
PALPITATIONS: 0
DIZZINESS: 0
NERVOUS/ANXIOUS: 0
HEARTBURN: 0
SHORTNESS OF BREATH: 0
ABDOMINAL PAIN: 0
CHILLS: 0
DIARRHEA: 0

## 2023-06-16 ENCOUNTER — OFFICE VISIT (OUTPATIENT)
Dept: FAMILY MEDICINE | Facility: CLINIC | Age: 33
End: 2023-06-16
Payer: COMMERCIAL

## 2023-06-16 VITALS
DIASTOLIC BLOOD PRESSURE: 68 MMHG | OXYGEN SATURATION: 97 % | RESPIRATION RATE: 21 BRPM | HEART RATE: 56 BPM | BODY MASS INDEX: 30.02 KG/M2 | SYSTOLIC BLOOD PRESSURE: 107 MMHG | TEMPERATURE: 97.6 F | HEIGHT: 69 IN | WEIGHT: 202.7 LBS

## 2023-06-16 DIAGNOSIS — Z00.00 ROUTINE GENERAL MEDICAL EXAMINATION AT A HEALTH CARE FACILITY: Primary | ICD-10-CM

## 2023-06-16 DIAGNOSIS — K51.80 OTHER ULCERATIVE COLITIS WITHOUT COMPLICATION (H): ICD-10-CM

## 2023-06-16 LAB
BASOPHILS # BLD AUTO: 0.1 10E3/UL (ref 0–0.2)
BASOPHILS NFR BLD AUTO: 1 %
CHOLEST SERPL-MCNC: 165 MG/DL
EOSINOPHIL # BLD AUTO: 0.7 10E3/UL (ref 0–0.7)
EOSINOPHIL NFR BLD AUTO: 10 %
ERYTHROCYTE [DISTWIDTH] IN BLOOD BY AUTOMATED COUNT: 14.1 % (ref 10–15)
FASTING STATUS PATIENT QL REPORTED: YES
FERRITIN SERPL-MCNC: 13 NG/ML (ref 31–409)
GLUCOSE SERPL-MCNC: 91 MG/DL (ref 70–99)
HCT VFR BLD AUTO: 39.4 % (ref 40–53)
HDLC SERPL-MCNC: 42 MG/DL
HGB BLD-MCNC: 12.3 G/DL (ref 13.3–17.7)
IMM GRANULOCYTES # BLD: 0 10E3/UL
IMM GRANULOCYTES NFR BLD: 0 %
IRON BINDING CAPACITY (ROCHE): 397 UG/DL (ref 240–430)
IRON SATN MFR SERPL: 8 % (ref 15–46)
IRON SERPL-MCNC: 32 UG/DL (ref 61–157)
LDLC SERPL CALC-MCNC: 105 MG/DL
LYMPHOCYTES # BLD AUTO: 3 10E3/UL (ref 0.8–5.3)
LYMPHOCYTES NFR BLD AUTO: 39 %
MCH RBC QN AUTO: 24.5 PG (ref 26.5–33)
MCHC RBC AUTO-ENTMCNC: 31.2 G/DL (ref 31.5–36.5)
MCV RBC AUTO: 79 FL (ref 78–100)
MONOCYTES # BLD AUTO: 0.7 10E3/UL (ref 0–1.3)
MONOCYTES NFR BLD AUTO: 9 %
NEUTROPHILS # BLD AUTO: 3.2 10E3/UL (ref 1.6–8.3)
NEUTROPHILS NFR BLD AUTO: 42 %
NONHDLC SERPL-MCNC: 123 MG/DL
PLATELET # BLD AUTO: 270 10E3/UL (ref 150–450)
RBC # BLD AUTO: 5.02 10E6/UL (ref 4.4–5.9)
TRIGL SERPL-MCNC: 88 MG/DL
WBC # BLD AUTO: 7.7 10E3/UL (ref 4–11)

## 2023-06-16 PROCEDURE — 36415 COLL VENOUS BLD VENIPUNCTURE: CPT | Performed by: PHYSICIAN ASSISTANT

## 2023-06-16 PROCEDURE — 82728 ASSAY OF FERRITIN: CPT | Performed by: PHYSICIAN ASSISTANT

## 2023-06-16 PROCEDURE — 82947 ASSAY GLUCOSE BLOOD QUANT: CPT | Performed by: PHYSICIAN ASSISTANT

## 2023-06-16 PROCEDURE — 99395 PREV VISIT EST AGE 18-39: CPT | Performed by: PHYSICIAN ASSISTANT

## 2023-06-16 PROCEDURE — 80061 LIPID PANEL: CPT | Performed by: PHYSICIAN ASSISTANT

## 2023-06-16 PROCEDURE — 85025 COMPLETE CBC W/AUTO DIFF WBC: CPT | Performed by: PHYSICIAN ASSISTANT

## 2023-06-16 PROCEDURE — 83540 ASSAY OF IRON: CPT | Performed by: PHYSICIAN ASSISTANT

## 2023-06-16 PROCEDURE — 83550 IRON BINDING TEST: CPT | Performed by: PHYSICIAN ASSISTANT

## 2023-06-16 RX ORDER — VEDOLIZUMAB 300 MG/5ML
300 INJECTION, POWDER, LYOPHILIZED, FOR SOLUTION INTRAVENOUS
Qty: 1 EACH | Refills: 0 | COMMUNITY
Start: 2023-06-16

## 2023-06-16 ASSESSMENT — PAIN SCALES - GENERAL: PAINLEVEL: NO PAIN (0)

## 2023-06-16 ASSESSMENT — ENCOUNTER SYMPTOMS
DIZZINESS: 0
HEARTBURN: 0
CONSTIPATION: 0
PALPITATIONS: 0
NERVOUS/ANXIOUS: 0
COUGH: 0
FEVER: 0
HEADACHES: 0
DIARRHEA: 0
SORE THROAT: 0
HEMATOCHEZIA: 0
FREQUENCY: 0
ABDOMINAL PAIN: 0
ARTHRALGIAS: 0
JOINT SWELLING: 0
CHILLS: 0
EYE PAIN: 0
HEMATURIA: 0
MYALGIAS: 0
WEAKNESS: 0
DYSURIA: 0
PARESTHESIAS: 0
NAUSEA: 0
SHORTNESS OF BREATH: 0

## 2023-06-16 NOTE — PATIENT INSTRUCTIONS
Ask your GI doc about recommended immunizations and timing of those while on your infusion. We can do them here if they'd prefer, just need to know timing and specific recommended vaccines.  Preventive Health Recommendations  Male Ages 26 - 39    Yearly exam:             See your health care provider every year in order to  o   Review health changes.   o   Discuss preventive care.    o   Review your medicines if your doctor has prescribed any.    You should be tested each year for STDs (sexually transmitted diseases), if you re at risk.     After age 35, talk to your provider about cholesterol testing. If you are at risk for heart disease, have your cholesterol tested at least every 5 years.     If you are at risk for diabetes, you should have a diabetes test (fasting glucose).  Shots: Get a flu shot each year. Get a tetanus shot every 10 years.     Nutrition:    Eat at least 5 servings of fruits and vegetables daily.     Eat whole-grain bread, whole-wheat pasta and brown rice instead of white grains and rice.     Get adequate Calcium and Vitamin D.     Lifestyle    Exercise for at least 150 minutes a week (30 minutes a day, 5 days a week). This will help you control your weight and prevent disease.     Limit alcohol to one drink per day.     No smoking.     Wear sunscreen to prevent skin cancer.     See your dentist every six months for an exam and cleaning.     Preventive Health Recommendations  Male Ages 26 - 39    Yearly exam:             See your health care provider every year in order to  o   Review health changes.   o   Discuss preventive care.    o   Review your medicines if your doctor has prescribed any.    You should be tested each year for STDs (sexually transmitted diseases), if you re at risk.     After age 35, talk to your provider about cholesterol testing. If you are at risk for heart disease, have your cholesterol tested at least every 5 years.     If you are at risk for diabetes, you should have  a diabetes test (fasting glucose).  Shots: Get a flu shot each year. Get a tetanus shot every 10 years.     Nutrition:    Eat at least 5 servings of fruits and vegetables daily.     Eat whole-grain bread, whole-wheat pasta and brown rice instead of white grains and rice.     Get adequate Calcium and Vitamin D.     Lifestyle    Exercise for at least 150 minutes a week (30 minutes a day, 5 days a week). This will help you control your weight and prevent disease.     Limit alcohol to one drink per day.     No smoking.     Wear sunscreen to prevent skin cancer.     See your dentist every six months for an exam and cleaning.

## 2023-06-16 NOTE — PROGRESS NOTES
SUBJECTIVE:   CC: Mao is an 33 year old who presents for preventative health visit.       6/16/2023    10:57 AM   Additional Questions   Roomed by Shanita BECKFORD   Accompanied by Self         6/16/2023    10:57 AM   Patient Reported Additional Medications   Patient reports taking the following new medications None     Healthy Habits:     Getting at least 3 servings of Calcium per day:  Yes    Bi-annual eye exam:  Yes    Dental care twice a year:  Yes    Sleep apnea or symptoms of sleep apnea:  None    Diet:  Regular (no restrictions)    Frequency of exercise:  2-3 days/week    Duration of exercise:  30-45 minutes    Taking medications regularly:  Yes    Medication side effects:  Not applicable    PHQ-2 Total Score: 0    Additional concerns today:  No    Mao Patel is a 33 year old male who presents today for annual check up  He continues with GI for his UC; switched now to infusion as his former treatmetns were not helpful  He is tolerating that well and feels much better control  Continues to active with hiking, outdoors      Today's PHQ-2 Score:       6/16/2023    10:37 AM   PHQ-2 ( 1999 [employer])   Q1: Little interest or pleasure in doing things 0   Q2: Feeling down, depressed or hopeless 0   PHQ-2 Score 0   Q1: Little interest or pleasure in doing things Not at all   Q2: Feeling down, depressed or hopeless Not at all   PHQ-2 Score 0       Have you ever done Advance Care Planning? (For example, a Health Directive, POLST, or a discussion with a medical provider or your loved ones about your wishes): No, advance care planning information given to patient to review.  Patient declined advance care planning discussion at this time.    Social History     Tobacco Use     Smoking status: Never     Passive exposure: Never     Smokeless tobacco: Never   Vaping Use     Vaping status: Never Used   Substance Use Topics     Alcohol use: Not Currently     Comment: Once a week during weekend.             6/9/2023     9:11 AM  "  Alcohol Use   Prescreen: >3 drinks/day or >7 drinks/week? No     Last PSA: No results found for: PSA    Reviewed orders with patient. Reviewed health maintenance and updated orders accordingly - Yes  Lab work is in process  Labs reviewed in EPIC    Reviewed and updated as needed this visit by clinical staff   Tobacco  Allergies  Meds     [facility] Hx          Reviewed and updated as needed this visit by Provider         [facility] Hx           Review of Systems   Constitutional: Negative for chills and fever.   HENT: Negative for congestion, ear pain, hearing loss and sore throat.    Eyes: Negative for pain and visual disturbance.   Respiratory: Negative for cough and shortness of breath.    Cardiovascular: Negative for chest pain, palpitations and peripheral edema.   Gastrointestinal: Negative for abdominal pain, constipation, diarrhea, heartburn, hematochezia and nausea.   Genitourinary: Negative for dysuria, frequency, genital sores, hematuria, impotence, penile discharge and urgency.   Musculoskeletal: Negative for arthralgias, joint swelling and myalgias.   Skin: Negative for rash.   Neurological: Negative for dizziness, weakness, headaches and paresthesias.   Psychiatric/Behavioral: Negative for mood changes. The patient is not nervous/anxious.      OBJECTIVE:   /68 (BP Location: Right arm, Patient Position: Sitting, Cuff Size: Adult Regular)   Pulse 56   Temp 97.6  F (36.4  C) (Oral)   Resp 21   Ht 1.758 m (5' 9.2\")   Wt 91.9 kg (202 lb 11.2 oz)   SpO2 97%   BMI 29.76 kg/m      Physical Exam  GENERAL: healthy, alert and no distress  EYES: Eyes grossly normal to inspection, PERRL and conjunctivae and sclerae normal  HENT: ear canals and TM's normal, nose and mouth without ulcers or lesions  NECK: no adenopathy, no asymmetry, masses, or scars and thyroid normal to palpation  RESP: lungs clear to auscultation - no rales, rhonchi or wheezes  CV: regular rate and rhythm, normal S1 S2, no S3 or S4, no " "murmur, click or rub, no peripheral edema and peripheral pulses strong  ABDOMEN: soft, nontender, no hepatosplenomegaly, no masses and bowel sounds normal  MS: no gross musculoskeletal defects noted, no edema  SKIN: no suspicious lesions or rashes  PSYCH: mentation appears normal, affect normal/bright    Diagnostic Test Results:  Labs reviewed in Epic  updating    ASSESSMENT/PLAN:   1. Routine general medical examination at a health care facility  Reviewed personal and family history. Reviewed age appropriate screenings. Recommended any needed vaccinations - specifically he is not on entyvio and did not receive a number of recommended vaccines. Kim asked him to consult with GI the best timing for these ie Hep A, B, pneumonia, shingrix  - Lipid panel reflex to direct LDL Fasting; Future  - Glucose; Future  - Lipid panel reflex to direct LDL Fasting  - Glucose    2. Other ulcerative colitis without complication (H)  Hx of bob, updating; not currently on iron  - CBC with Platelets & Differential  - Ferritin  - Iron & Iron Binding Capacity      COUNSELING:   Reviewed preventive health counseling, as reflected in patient instructions      BMI:   Estimated body mass index is 29.76 kg/m  as calculated from the following:    Height as of this encounter: 1.758 m (5' 9.2\").    Weight as of this encounter: 91.9 kg (202 lb 11.2 oz).       He reports that he has never smoked. He has never been exposed to tobacco smoke. He has never used smokeless tobacco.            JUSTA Murphy [facility]  "

## 2023-06-21 ENCOUNTER — TELEPHONE (OUTPATIENT)
Dept: FAMILY MEDICINE | Facility: CLINIC | Age: 33
End: 2023-06-21

## 2023-06-21 ENCOUNTER — ALLIED HEALTH/NURSE VISIT (OUTPATIENT)
Dept: FAMILY MEDICINE | Facility: CLINIC | Age: 33
End: 2023-06-21
Payer: COMMERCIAL

## 2023-06-21 DIAGNOSIS — Z23 ENCOUNTER FOR IMMUNIZATION: Primary | ICD-10-CM

## 2023-06-21 PROCEDURE — 90677 PCV20 VACCINE IM: CPT

## 2023-06-21 PROCEDURE — 90471 IMMUNIZATION ADMIN: CPT

## 2023-06-21 PROCEDURE — 99207 PR NO CHARGE NURSE ONLY: CPT

## 2023-06-21 PROCEDURE — 90636 HEP A/HEP B VACC ADULT IM: CPT

## 2023-06-21 PROCEDURE — 90472 IMMUNIZATION ADMIN EACH ADD: CPT

## 2023-06-21 NOTE — TELEPHONE ENCOUNTER
I'm reviewing this note - just to confirm -- today he got twinrix and P20, and I am placing an order for the 2nd twinrix and the start of shingrix?

## 2023-06-21 NOTE — TELEPHONE ENCOUNTER
Good morning,    Will you please place an order for this patient to get future Twinrix, and Shingrix vaccines.      He was in clinic today and requested vaccines. Placed per your note, with POD.     Shelia Jaimes MA    Patient was given okay from TIFFANY ROTHMAN to do whenever.

## 2023-06-21 NOTE — PROGRESS NOTES
Prior to immunization administration, verified patients identity using patient s name and date of birth. Please see Immunization Activity for additional information.     Screening Questionnaire for Adult Immunization    Are you sick today?   Yes   Do you have allergies to medications, food, a vaccine component or latex?   No   Have you ever had a serious reaction after receiving a vaccination?   No   Do you have a long-term health problem with heart, lung, kidney, or metabolic disease (e.g., diabetes), asthma, a blood disorder, no spleen, complement component deficiency, a cochlear implant, or a spinal fluid leak?  Are you on long-term aspirin therapy?   No   Do you have cancer, leukemia, HIV/AIDS, or any other immune system problem?   Yes   Do you have a parent, brother, or sister with an immune system problem?   No   In the past 3 months, have you taken medications that affect  your immune system, such as prednisone, other steroids, or anticancer drugs; drugs for the treatment of rheumatoid arthritis, Crohn s disease, or psoriasis; or have you had radiation treatments?   Yes   Have you had a seizure, or a brain or other nervous system problem?   No   During the past year, have you received a transfusion of blood or blood    products, or been given immune (gamma) globulin or antiviral drug?   No   For women: Are you pregnant or is there a chance you could become       pregnant during the next month?   No   Have you received any vaccinations in the past 4 weeks?   No     Immunization questionnaire was positive for at least one answer.  Notified Wilma Garza CNP.    I have reviewed the following standing orders:     This patient is due and qualifies for the Hepatitis A & B vaccine.    Click here for HEP A & B STANDING ORDER    I have reviewed the vaccines inclusion and exclusion criteria; No concerns regarding eligibility.   This patient is due and qualifies for the Pneumococcal vaccine.    Click here for  Pneumococcal (Adult) Standing Order    I have reviewed the vaccines inclusion and exclusion criteria;No concerns regarding eligibility.         Patient instructed to remain in clinic for 15 minutes afterwards, and to report any adverse reactions.     Screening performed by Shelia Jaimes MA on 6/21/2023 at 11:05 AM.

## 2023-07-14 ENCOUNTER — TRANSFERRED RECORDS (OUTPATIENT)
Dept: HEALTH INFORMATION MANAGEMENT | Facility: CLINIC | Age: 33
End: 2023-07-14

## 2023-07-28 ENCOUNTER — TRANSFERRED RECORDS (OUTPATIENT)
Dept: HEALTH INFORMATION MANAGEMENT | Facility: CLINIC | Age: 33
End: 2023-07-28
Payer: COMMERCIAL

## 2023-09-08 ENCOUNTER — TRANSFERRED RECORDS (OUTPATIENT)
Dept: HEALTH INFORMATION MANAGEMENT | Facility: CLINIC | Age: 33
End: 2023-09-08
Payer: COMMERCIAL

## 2023-11-03 ENCOUNTER — TRANSFERRED RECORDS (OUTPATIENT)
Dept: HEALTH INFORMATION MANAGEMENT | Facility: CLINIC | Age: 33
End: 2023-11-03

## 2024-01-05 ENCOUNTER — MEDICAL CORRESPONDENCE (OUTPATIENT)
Dept: HEALTH INFORMATION MANAGEMENT | Facility: CLINIC | Age: 34
End: 2024-01-05

## 2024-01-05 ENCOUNTER — TRANSFERRED RECORDS (OUTPATIENT)
Dept: HEALTH INFORMATION MANAGEMENT | Facility: CLINIC | Age: 34
End: 2024-01-05

## 2024-01-17 ENCOUNTER — E-VISIT (OUTPATIENT)
Dept: URGENT CARE | Facility: CLINIC | Age: 34
End: 2024-01-17
Payer: COMMERCIAL

## 2024-01-17 DIAGNOSIS — R19.7 DIARRHEA, UNSPECIFIED TYPE: Primary | ICD-10-CM

## 2024-01-17 PROCEDURE — 99207 PR NON-BILLABLE SERV PER CHARTING: CPT | Performed by: NURSE PRACTITIONER

## 2024-01-17 NOTE — PATIENT INSTRUCTIONS
Dear Mao Patel,    We are sorry you are not feeling well. Based on the responses you provided, you may be experiencing a serious health condition that needs immediate in-person attention. It is recommended that you be seen in the emergency room in order to better evaluate your symptoms. Please click here to find the nearest Emergency Room.     Ede Dickerson NP

## 2024-01-18 ENCOUNTER — OFFICE VISIT (OUTPATIENT)
Dept: URGENT CARE | Facility: URGENT CARE | Age: 34
End: 2024-01-18
Payer: COMMERCIAL

## 2024-01-18 VITALS
RESPIRATION RATE: 20 BRPM | OXYGEN SATURATION: 98 % | DIASTOLIC BLOOD PRESSURE: 80 MMHG | HEART RATE: 99 BPM | TEMPERATURE: 100.8 F | SYSTOLIC BLOOD PRESSURE: 117 MMHG

## 2024-01-18 DIAGNOSIS — R19.7 DIARRHEA, UNSPECIFIED TYPE: Primary | ICD-10-CM

## 2024-01-18 DIAGNOSIS — K51.90 ULCERATIVE COLITIS (H): ICD-10-CM

## 2024-01-18 DIAGNOSIS — R50.9 FEVER, UNSPECIFIED FEVER CAUSE: ICD-10-CM

## 2024-01-18 LAB
ANION GAP SERPL CALCULATED.3IONS-SCNC: 9 MMOL/L (ref 7–15)
BUN SERPL-MCNC: 5.8 MG/DL (ref 6–20)
CALCIUM SERPL-MCNC: 8.4 MG/DL (ref 8.6–10)
CHLORIDE SERPL-SCNC: 99 MMOL/L (ref 98–107)
CREAT SERPL-MCNC: 0.97 MG/DL (ref 0.67–1.17)
DEPRECATED HCO3 PLAS-SCNC: 27 MMOL/L (ref 22–29)
EGFRCR SERPLBLD CKD-EPI 2021: >90 ML/MIN/1.73M2
FLUAV AG SPEC QL IA: NEGATIVE
FLUBV AG SPEC QL IA: NEGATIVE
GLUCOSE SERPL-MCNC: 100 MG/DL (ref 70–99)
POTASSIUM SERPL-SCNC: 4.8 MMOL/L (ref 3.4–5.3)
SODIUM SERPL-SCNC: 135 MMOL/L (ref 135–145)

## 2024-01-18 PROCEDURE — 87493 C DIFF AMPLIFIED PROBE: CPT | Performed by: FAMILY MEDICINE

## 2024-01-18 PROCEDURE — 36415 COLL VENOUS BLD VENIPUNCTURE: CPT | Performed by: FAMILY MEDICINE

## 2024-01-18 PROCEDURE — 87507 IADNA-DNA/RNA PROBE TQ 12-25: CPT | Performed by: FAMILY MEDICINE

## 2024-01-18 PROCEDURE — 87804 INFLUENZA ASSAY W/OPTIC: CPT | Performed by: FAMILY MEDICINE

## 2024-01-18 PROCEDURE — 87635 SARS-COV-2 COVID-19 AMP PRB: CPT | Performed by: FAMILY MEDICINE

## 2024-01-18 PROCEDURE — 80048 BASIC METABOLIC PNL TOTAL CA: CPT | Performed by: FAMILY MEDICINE

## 2024-01-18 PROCEDURE — 99213 OFFICE O/P EST LOW 20 MIN: CPT | Performed by: FAMILY MEDICINE

## 2024-01-18 NOTE — PROGRESS NOTES
URGENT CARE    ASSESSMENT AND PLAN:      ICD-10-CM    1. Diarrhea, unspecified type  R19.7 Influenza A & B Antigen     Symptomatic COVID-19 Virus (Coronavirus) by PCR Nose     C. difficile Toxin B PCR with reflex to C. difficile Antigen and Toxins A/B EIA     Basic metabolic panel     Enteric Bacteria and Virus Panel PCR      2. Fever, unspecified fever cause  R50.9 Influenza A & B Antigen     Symptomatic COVID-19 Virus (Coronavirus) by PCR Nose     C. difficile Toxin B PCR with reflex to C. difficile Antigen and Toxins A/B EIA     Basic metabolic panel     Enteric Bacteria and Virus Panel PCR          Flu is negative today.  COVID-19 is pending.  BMP is pending and will most likely come back tomorrow.  Stool samples ordered and he will bring back for rule out.  Supportive and OTC measures outlined in AVS. we discussed that if all findings do not show significant abnormality of needing treatment he will need to follow-up with his [facility] provider for further evaluation.      Reviewed alarm signs and symptoms needing urgent evaluation.     Follow up with primary care provider with any problems, questions or concerns or if symptoms worsen or fail to improve. Patient verbalized understanding and is agreeable to plan. The patient was discharged ambulatory and in stable condition.        Subjective     Mao Patel is a 33 year old who presents for watery diarrhea and occasional vomiting.  New onset of fever the past few days.  He reports over the past week diarrhea quantity has increased to 5 BMs a day if he does not take Imodium daily otherwise if he does take medication he has less than 3.  He does have a history of ulcerative colitis and has been well-controlled with Entyvio injections every 2 months and seeing [facility].  Last injection was at the beginning of the month and has not had symptoms such as these with prior injections.  Patient has been able to stay hydrated with electrolyte drinks and eating  small frequent meals throughout the day.      Denies recent hospitalization, antibiotic use, bloating, melena, hematochezia, urinary symptoms, fatigue/lethargy, or abdominal pain.      Review of Systems  All systems reviewed and negative except per HPI.        Objective    /80   Pulse 99   Temp (!) 100.8  F (38.2  C) (Tympanic)   Resp 20   SpO2 98%     Physical Exam   GENERAL: alert and no distress  EYES: Eyes grossly normal to inspection, PERRL and conjunctivae and sclerae normal  HENT: ear canals and TM's normal, nose and mouth without ulcers or lesions  NECK: no adenopathy, no asymmetry, masses, or scars  RESP: lungs clear to auscultation - no rales, rhonchi or wheezes  CV: regular rate and rhythm, normal S1 S2, no S3 or S4, no murmur, click or rub, no peripheral edema  ABDOMEN: soft, nontender, no hepatosplenomegaly, no masses   PSYCH: mentation appears normal, affect normal/bright      Results for orders placed or performed in visit on 01/18/24 (from the past 24 hour(s))   Influenza A & B Antigen    Specimen: Nose; Swab   Result Value Ref Range    Influenza A antigen Negative Negative    Influenza B antigen Negative Negative    Narrative    Test results must be correlated with clinical data. If necessary, results should be confirmed by a molecular assay or viral culture.

## 2024-01-18 NOTE — PATIENT INSTRUCTIONS
Please bring back the stool sample and if needing treatment we will go ahead and do so.    If flu or COVID should be positive you will be notified on MyChart.    If all your labs are unremarkable please follow-up with your GI provider.      Ease digestive problems  Put fluid back into your body. Take frequent sips of clear liquids such as water or broth. Do not drink beverages with a lot of sugar in them, such as juices and sodas. These can make diarrhea worse. Older children and adults can drink sports drinks.  Patient will need to drink at least 1.5-2 liters of fluids daily for adults and 1-1.5 liters for children. If vomiting and not tolerating liquids for more than 24 hrs, please go to your nearest emergency department for IV fluids and further treatment.   Maintain hydration by drinking small amounts of clear fluids frequently, then soft diet, and then advance diet as tolerated. May use any prescribed imodium if desired for any diarrhea. Call if symptoms worsen, high fever, severe weakness or fainting, increased abdominal pain, blood in stool or vomit, or failure to improve in 2-3 days.   As your appetite returns, you can resume your normal diet. Ask your doctor whether there are any foods you should avoid.

## 2024-01-19 LAB
ADV 40+41 DNA STL QL NAA+NON-PROBE: NEGATIVE
ASTRO TYP 1-8 RNA STL QL NAA+NON-PROBE: NEGATIVE
C CAYETANENSIS DNA STL QL NAA+NON-PROBE: NEGATIVE
C DIFF TOX B STL QL: NEGATIVE
CAMPYLOBACTER DNA SPEC NAA+PROBE: NEGATIVE
CRYPTOSP DNA STL QL NAA+NON-PROBE: NEGATIVE
E COLI O157 DNA STL QL NAA+NON-PROBE: NORMAL
E HISTOLYT DNA STL QL NAA+NON-PROBE: NEGATIVE
EAEC ASTA GENE ISLT QL NAA+PROBE: NEGATIVE
EC STX1+STX2 GENES STL QL NAA+NON-PROBE: NEGATIVE
EPEC EAE GENE STL QL NAA+NON-PROBE: NEGATIVE
ETEC LTA+ST1A+ST1B TOX ST NAA+NON-PROBE: NEGATIVE
G LAMBLIA DNA STL QL NAA+NON-PROBE: NEGATIVE
NOROVIRUS GI+II RNA STL QL NAA+NON-PROBE: NEGATIVE
P SHIGELLOIDES DNA STL QL NAA+NON-PROBE: NEGATIVE
RVA RNA STL QL NAA+NON-PROBE: NEGATIVE
SALMONELLA SP RPOD STL QL NAA+PROBE: NEGATIVE
SAPO I+II+IV+V RNA STL QL NAA+NON-PROBE: NEGATIVE
SARS-COV-2 RNA RESP QL NAA+PROBE: NEGATIVE
SHIGELLA SP+EIEC IPAH ST NAA+NON-PROBE: NEGATIVE
V CHOLERAE DNA SPEC QL NAA+PROBE: NEGATIVE
VIBRIO DNA SPEC NAA+PROBE: NEGATIVE
Y ENTEROCOL DNA STL QL NAA+PROBE: NEGATIVE

## 2024-01-23 DIAGNOSIS — K51.90 ULCERATIVE COLITIS (H): Primary | ICD-10-CM

## 2024-01-23 PROCEDURE — 83993 ASSAY FOR CALPROTECTIN FECAL: CPT | Performed by: FAMILY MEDICINE

## 2024-01-24 LAB — CALPROTECTIN STL-MCNT: 3530 MG/KG (ref 0–49.9)

## 2024-04-17 ENCOUNTER — TRANSFERRED RECORDS (OUTPATIENT)
Dept: HEALTH INFORMATION MANAGEMENT | Facility: CLINIC | Age: 34
End: 2024-04-17
Payer: COMMERCIAL

## 2024-09-22 ENCOUNTER — HEALTH MAINTENANCE LETTER (OUTPATIENT)
Age: 34
End: 2024-09-22

## 2024-10-16 ENCOUNTER — E-VISIT (OUTPATIENT)
Dept: FAMILY MEDICINE | Facility: CLINIC | Age: 34
End: 2024-10-16
Payer: COMMERCIAL

## 2024-10-16 DIAGNOSIS — K51.80 OTHER ULCERATIVE COLITIS WITHOUT COMPLICATION (H): ICD-10-CM

## 2024-10-16 DIAGNOSIS — Z79.60 LONG-TERM USE OF IMMUNOSUPPRESSANT MEDICATION: Primary | ICD-10-CM

## 2024-10-16 DIAGNOSIS — Z79.899 MEDICATION MANAGEMENT: ICD-10-CM

## 2024-10-16 PROCEDURE — 99421 OL DIG E/M SVC 5-10 MIN: CPT | Performed by: PHYSICIAN ASSISTANT

## 2024-10-26 ENCOUNTER — IMMUNIZATION (OUTPATIENT)
Dept: PEDIATRICS | Facility: CLINIC | Age: 34
End: 2024-10-26
Payer: COMMERCIAL

## 2024-10-26 PROCEDURE — 90656 IIV3 VACC NO PRSV 0.5 ML IM: CPT

## 2024-10-26 PROCEDURE — 91320 SARSCV2 VAC 30MCG TRS-SUC IM: CPT

## 2024-10-26 PROCEDURE — 90480 ADMN SARSCOV2 VAC 1/ONLY CMP: CPT

## 2024-10-26 PROCEDURE — 90471 IMMUNIZATION ADMIN: CPT

## 2024-10-30 ENCOUNTER — ALLIED HEALTH/NURSE VISIT (OUTPATIENT)
Dept: FAMILY MEDICINE | Facility: CLINIC | Age: 34
End: 2024-10-30
Payer: COMMERCIAL

## 2024-10-30 DIAGNOSIS — Z23 NEED FOR VACCINATION WITH TWINRIX: Primary | ICD-10-CM

## 2024-10-30 DIAGNOSIS — Z23 NEED FOR TDAP VACCINATION: ICD-10-CM

## 2024-10-30 DIAGNOSIS — Z23 POLIO VACCINE NEEDED: ICD-10-CM

## 2024-10-30 PROCEDURE — 90471 IMMUNIZATION ADMIN: CPT

## 2024-10-30 PROCEDURE — 99207 PR NO CHARGE NURSE ONLY: CPT

## 2024-10-30 PROCEDURE — 90715 TDAP VACCINE 7 YRS/> IM: CPT

## 2024-10-30 PROCEDURE — 90636 HEP A/HEP B VACC ADULT IM: CPT

## 2024-10-30 PROCEDURE — 90713 POLIOVIRUS IPV SC/IM: CPT

## 2024-10-30 PROCEDURE — 90472 IMMUNIZATION ADMIN EACH ADD: CPT

## 2025-02-28 SDOH — HEALTH STABILITY: PHYSICAL HEALTH: ON AVERAGE, HOW MANY MINUTES DO YOU ENGAGE IN EXERCISE AT THIS LEVEL?: 40 MIN

## 2025-02-28 SDOH — HEALTH STABILITY: PHYSICAL HEALTH: ON AVERAGE, HOW MANY DAYS PER WEEK DO YOU ENGAGE IN MODERATE TO STRENUOUS EXERCISE (LIKE A BRISK WALK)?: 3 DAYS

## 2025-02-28 ASSESSMENT — SOCIAL DETERMINANTS OF HEALTH (SDOH): HOW OFTEN DO YOU GET TOGETHER WITH FRIENDS OR RELATIVES?: ONCE A WEEK

## 2025-03-04 ENCOUNTER — ORDERS ONLY (AUTO-RELEASED) (OUTPATIENT)
Dept: INTERNAL MEDICINE | Facility: CLINIC | Age: 35
End: 2025-03-04

## 2025-03-04 ENCOUNTER — OFFICE VISIT (OUTPATIENT)
Dept: INTERNAL MEDICINE | Facility: CLINIC | Age: 35
End: 2025-03-04
Payer: COMMERCIAL

## 2025-03-04 VITALS
DIASTOLIC BLOOD PRESSURE: 65 MMHG | TEMPERATURE: 98.3 F | HEIGHT: 70 IN | HEART RATE: 67 BPM | OXYGEN SATURATION: 98 % | BODY MASS INDEX: 29.54 KG/M2 | RESPIRATION RATE: 20 BRPM | SYSTOLIC BLOOD PRESSURE: 109 MMHG | WEIGHT: 206.3 LBS

## 2025-03-04 DIAGNOSIS — D50.0 IRON DEFICIENCY ANEMIA DUE TO CHRONIC BLOOD LOSS: ICD-10-CM

## 2025-03-04 DIAGNOSIS — K51.80 OTHER ULCERATIVE COLITIS WITHOUT COMPLICATION (H): ICD-10-CM

## 2025-03-04 DIAGNOSIS — R00.2 PALPITATIONS: ICD-10-CM

## 2025-03-04 DIAGNOSIS — Z00.00 ENCOUNTER FOR PREVENTIVE HEALTH EXAMINATION: Primary | ICD-10-CM

## 2025-03-04 DIAGNOSIS — Z13.220 LIPID SCREENING: ICD-10-CM

## 2025-03-04 LAB
ALBUMIN SERPL BCG-MCNC: 4.3 G/DL (ref 3.5–5.2)
ALP SERPL-CCNC: 68 U/L (ref 40–150)
ALT SERPL W P-5'-P-CCNC: 92 U/L (ref 0–70)
ANION GAP SERPL CALCULATED.3IONS-SCNC: 10 MMOL/L (ref 7–15)
AST SERPL W P-5'-P-CCNC: 160 U/L (ref 0–45)
BASOPHILS # BLD AUTO: 0 10E3/UL (ref 0–0.2)
BASOPHILS NFR BLD AUTO: 0 %
BILIRUB SERPL-MCNC: 0.4 MG/DL
BUN SERPL-MCNC: 12.1 MG/DL (ref 6–20)
CALCIUM SERPL-MCNC: 9.7 MG/DL (ref 8.8–10.4)
CHLORIDE SERPL-SCNC: 103 MMOL/L (ref 98–107)
CHOLEST SERPL-MCNC: 131 MG/DL
CREAT SERPL-MCNC: 1 MG/DL (ref 0.67–1.17)
EGFRCR SERPLBLD CKD-EPI 2021: >90 ML/MIN/1.73M2
EOSINOPHIL # BLD AUTO: 0.2 10E3/UL (ref 0–0.7)
EOSINOPHIL NFR BLD AUTO: 2 %
ERYTHROCYTE [DISTWIDTH] IN BLOOD BY AUTOMATED COUNT: 16.6 % (ref 10–15)
FASTING STATUS PATIENT QL REPORTED: YES
FASTING STATUS PATIENT QL REPORTED: YES
FERRITIN SERPL-MCNC: 16 NG/ML (ref 31–409)
GLUCOSE SERPL-MCNC: 93 MG/DL (ref 70–99)
HCO3 SERPL-SCNC: 26 MMOL/L (ref 22–29)
HCT VFR BLD AUTO: 36 % (ref 40–53)
HDLC SERPL-MCNC: 36 MG/DL
HGB BLD-MCNC: 11.3 G/DL (ref 13.3–17.7)
IMM GRANULOCYTES # BLD: 0 10E3/UL
IMM GRANULOCYTES NFR BLD: 0 %
IRON BINDING CAPACITY (ROCHE): 362 UG/DL (ref 240–430)
IRON SATN MFR SERPL: 15 % (ref 15–46)
IRON SERPL-MCNC: 53 UG/DL (ref 61–157)
LDLC SERPL CALC-MCNC: 79 MG/DL
LYMPHOCYTES # BLD AUTO: 4.2 10E3/UL (ref 0.8–5.3)
LYMPHOCYTES NFR BLD AUTO: 50 %
MAGNESIUM SERPL-MCNC: 2 MG/DL (ref 1.7–2.3)
MCH RBC QN AUTO: 24.6 PG (ref 26.5–33)
MCHC RBC AUTO-ENTMCNC: 31.4 G/DL (ref 31.5–36.5)
MCV RBC AUTO: 78 FL (ref 78–100)
MONOCYTES # BLD AUTO: 0.7 10E3/UL (ref 0–1.3)
MONOCYTES NFR BLD AUTO: 8 %
NEUTROPHILS # BLD AUTO: 3.3 10E3/UL (ref 1.6–8.3)
NEUTROPHILS NFR BLD AUTO: 40 %
NONHDLC SERPL-MCNC: 95 MG/DL
PLATELET # BLD AUTO: 389 10E3/UL (ref 150–450)
POTASSIUM SERPL-SCNC: 4.8 MMOL/L (ref 3.4–5.3)
PROT SERPL-MCNC: 7.6 G/DL (ref 6.4–8.3)
RBC # BLD AUTO: 4.59 10E6/UL (ref 4.4–5.9)
SODIUM SERPL-SCNC: 139 MMOL/L (ref 135–145)
TRIGL SERPL-MCNC: 79 MG/DL
TSH SERPL DL<=0.005 MIU/L-ACNC: 2.08 UIU/ML (ref 0.3–4.2)
WBC # BLD AUTO: 8.4 10E3/UL (ref 4–11)

## 2025-03-04 PROCEDURE — 80061 LIPID PANEL: CPT | Performed by: NURSE PRACTITIONER

## 2025-03-04 PROCEDURE — 83735 ASSAY OF MAGNESIUM: CPT | Performed by: NURSE PRACTITIONER

## 2025-03-04 PROCEDURE — 3074F SYST BP LT 130 MM HG: CPT | Performed by: NURSE PRACTITIONER

## 2025-03-04 PROCEDURE — 84443 ASSAY THYROID STIM HORMONE: CPT | Performed by: NURSE PRACTITIONER

## 2025-03-04 PROCEDURE — 99395 PREV VISIT EST AGE 18-39: CPT | Performed by: NURSE PRACTITIONER

## 2025-03-04 PROCEDURE — 80053 COMPREHEN METABOLIC PANEL: CPT | Performed by: NURSE PRACTITIONER

## 2025-03-04 PROCEDURE — 3078F DIAST BP <80 MM HG: CPT | Performed by: NURSE PRACTITIONER

## 2025-03-04 PROCEDURE — 82728 ASSAY OF FERRITIN: CPT | Performed by: NURSE PRACTITIONER

## 2025-03-04 PROCEDURE — 83550 IRON BINDING TEST: CPT | Performed by: NURSE PRACTITIONER

## 2025-03-04 PROCEDURE — 83540 ASSAY OF IRON: CPT | Performed by: NURSE PRACTITIONER

## 2025-03-04 PROCEDURE — 85025 COMPLETE CBC W/AUTO DIFF WBC: CPT | Performed by: NURSE PRACTITIONER

## 2025-03-04 PROCEDURE — 36415 COLL VENOUS BLD VENIPUNCTURE: CPT | Performed by: NURSE PRACTITIONER

## 2025-03-04 PROCEDURE — 1126F AMNT PAIN NOTED NONE PRSNT: CPT | Performed by: NURSE PRACTITIONER

## 2025-03-04 ASSESSMENT — PAIN SCALES - GENERAL: PAINLEVEL_OUTOF10: NO PAIN (0)

## 2025-03-04 NOTE — PROGRESS NOTES
Preventive Care Visit  [facility]  Patito Hawley, SARA, Internal Medicine  Mar 4, 2025      Assessment & Plan   Problem List Items Addressed This Visit       Other ulcerative colitis without complication (H)    Relevant Orders    CBC with platelets and differential (Completed)    Comprehensive metabolic panel (BMP + Alb, Alk Phos, ALT, AST, Total. Bili, TP)    Iron and iron binding capacity    Ferritin     Other Visit Diagnoses       Encounter for preventive health examination    -  Primary    Iron deficiency anemia due to chronic blood loss        Relevant Orders    CBC with platelets and differential (Completed)    Comprehensive metabolic panel (BMP + Alb, Alk Phos, ALT, AST, Total. Bili, TP)    Iron and iron binding capacity    Ferritin    Palpitations        Relevant Orders    TSH with free T4 reflex    Magnesium    ZIO PATCH MAIL OUT    Lipid screening               Assessment & Plan  Post-viral headache  Suspected post-viral headache. Further imaging if symptoms persist beyond six weeks.  - Monitor headache symptoms for 2-3 more weeks.  - Order head scan if headaches persist.  - Use MyChart to report persistent symptoms.    Tachycardia  Increased heart rate with minor activities. Possible causes include thyroid dysfunction, iron deficiency, or cardiac arrhythmia. Family history of heart disease. Plan to investigate with blood tests and a heart monitor.  - Order blood tests including thyroid function, iron levels, cholesterol, blood sugar, kidney function, liver enzymes, electrolytes, and magnesium.  - Order a heart monitor for 3 days to assess heart rhythm.  - Cancel heart monitor if lab results indicate a clear cause for tachycardia.    Ulcerative colitis  Ulcerative colitis managed with Humira, symptoms well-controlled. Scheduled for colonoscopy and follow-up with gastroenterologist.  - Continue Humira as prescribed.  - Follow up with gastroenterologist for colonoscopy and further  "management.    Dry skin around eyes  Suspected eczema or xerosis exacerbated by seasonal changes. Recommended moisturizer or petrolatum. Consider low potency steroid cream if needed.  - Apply moisturizer or petrolatum to affected areas.  - Consider low potency steroid cream if moisturizer is insufficient.    Annual physical  Counseling: counseling provided regarding nutrition, regular exercise, general safety and periodic health exams   Immunizations up to date  Labs today    The longitudinal plan of care for the diagnosis(es)/condition(s) as documented were addressed during this visit. Due to the added complexity in care, I will continue to support Mao in the subsequent management and with ongoing continuity of care.    Consent was obtained from the patient to use an AI documentation tool in the creation of this note.           BMI  Estimated body mass index is 30.03 kg/m  as calculated from the following:    Height as of this encounter: 1.765 m (5' 9.5\").    Weight as of this encounter: 93.6 kg (206 lb 4.8 oz).       Counseling  Appropriate preventive services were addressed with this patient via screening, questionnaire, or discussion as appropriate for fall prevention, nutrition, physical activity, Tobacco-use cessation, social engagement, weight loss and cognition.  Checklist reviewing preventive services available has been given to the patient.  Reviewed patient's diet, addressing concerns and/or questions.   He is at risk for lack of exercise and has been provided with information to increase physical activity for the benefit of his well-being.   He is at risk for psychosocial distress and has been provided with information to reduce risk.           Subjective   Mao is a 34 year old, presenting for the following:  Physical        3/4/2025     7:29 AM   Additional Questions   Roomed by Milena Doyle   Accompanied by self         3/4/2025     7:29 AM   Patient Reported Additional Medications   Patient " reports taking the following new medications no          HPI  History of Present Illness  Mao Patel is a 34 year old male with ulcerative colitis who presents for an annual physical exam and concerns about elevated heart rate and headaches.    He has a history of ulcerative colitis with two flares last year, the last occurring in September. He has been taking Humira every two weeks since his last flare, which has been effective in managing his symptoms. He also takes pantoprazole daily and supplements including vitamin D, vitamin C, iron, magnesium, zinc, and omega-3. He is scheduled for a follow-up colonoscopy at the end of March.    He experiences an elevated heart rate over the past month, which increases with minor stress or activity. Previously, his heart rate was between 70 to 80 bpm, but it now rises to 120 bpm or more with minimal exertion, such as climbing stairs or lifting his son. During workouts, his heart rate can reach 160 to 170 bpm, but he recovers within a minute. He has not been able to monitor his heart rate recently due to a malfunctioning Fitbit. Family history includes an uncle with a heart attack due to high cholesterol.    He has been experiencing daily headaches for the past three weeks, primarily on the left side, accompanied by eyelid twitching. The headaches typically begin after lunch and persist until he falls asleep if untreated. He has stopped caffeine intake, suspecting it might be contributing to the headaches. He takes 500 mg of Tylenol, which provides partial relief, and occasionally uses Excedrin, which is more effective. The headaches coincide with a recent viral illness contracted from his son, initially presenting with sinus-related symptoms. 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    He reports dry skin around his eyes, which has been peeling and  itching. He uses Vaseline to manage the dryness, which he attributes to the season.             Advance Care Planning  Patient does not have a Health Care Directive: Discussed advance care planning with patient; however, patient declined at this time.      2/28/2025   General Health   How would you rate your overall physical health? (!) FAIR   Feel stress (tense, anxious, or unable to sleep) To some extent   (!) STRESS CONCERN      2/28/2025   Nutrition   Three or more servings of calcium each day? Yes   Diet: Regular (no restrictions)   How many servings of fruit and vegetables per day? (!) 0-1   How many sweetened beverages each day? 0-1         2/28/2025   Exercise   Days per week of moderate/strenous exercise 3 days   Average minutes spent exercising at this level 40 min         2/28/2025   Social Factors   Frequency of gathering with friends or relatives Once a week   Worry food won't last until get money to buy more No   Food not last or not have enough money for food? No   Do you have housing? (Housing is defined as stable permanent housing and does not include staying ouside in a car, in a tent, in an abandoned building, in an overnight shelter, or couch-surfing.) Yes   Are you worried about losing your housing? No   Lack of transportation? No   Unable to get utilities (heat,electricity)? No         2/28/2025   Dental   Dentist two times every year? Yes            Today's PHQ-2 Score:       3/3/2025     6:54 PM   PHQ-2 ( 1999 [employer])   Q1: Little interest or pleasure in doing things 0   Q2: Feeling down, depressed or hopeless 0   PHQ-2 Score 0    Q1: Little interest or pleasure in doing things Not at all   Q2: Feeling down, depressed or hopeless Not at all   PHQ-2 Score 0       Patient-reported           2/28/2025   Substance Use   Alcohol more than 3/day or more than 7/wk Not Applicable   Do you use any other substances recreationally? No     Social History     Tobacco Use    Smoking status: Never      "Passive exposure: Never    Smokeless tobacco: Never   Vaping Use    Vaping status: Never Used   Substance Use Topics    Alcohol use: Not Currently     Comment: Once a week during weekend.    Drug use: Never           2/28/2025   STI Screening   New sexual partner(s) since last STI/HIV test? No         2/28/2025   Contraception/Family Planning   Questions about contraception or family planning No        Reviewed and updated as needed this visit by Provider   Tobacco  Allergies  Meds  Problems  Med Hx  Surg Hx  Fam Hx                     Objective    Exam  /65 (BP Location: Left arm, Patient Position: Sitting, Cuff Size: Adult Large)   Pulse 67   Temp 98.3  F (36.8  C) (Oral)   Resp 20   Ht 1.765 m (5' 9.5\")   Wt 93.6 kg (206 lb 4.8 oz)   SpO2 98%   BMI 30.03 kg/m     Estimated body mass index is 30.03 kg/m  as calculated from the following:    Height as of this encounter: 1.765 m (5' 9.5\").    Weight as of this encounter: 93.6 kg (206 lb 4.8 oz).    Physical Exam  Vitals and nursing note reviewed.   Constitutional:       General: He is not in acute distress.     Appearance: Normal appearance. He is not ill-appearing.   HENT:      Head: Normocephalic and atraumatic.      Right Ear: Tympanic membrane, ear canal and external ear normal.      Left Ear: Tympanic membrane, ear canal and external ear normal.      Nose: Nose normal.      Mouth/Throat:      Mouth: Mucous membranes are moist.      Pharynx: Oropharynx is clear.   Eyes:      Extraocular Movements: Extraocular movements intact.      Conjunctiva/sclera: Conjunctivae normal.      Pupils: Pupils are equal, round, and reactive to light.      Comments: Dry skin around bilateral eyes    Cardiovascular:      Rate and Rhythm: Normal rate and regular rhythm.      Pulses: Normal pulses.      Heart sounds: Normal heart sounds.   Pulmonary:      Effort: Pulmonary effort is normal.      Breath sounds: Normal breath sounds.   Abdominal:      General: Bowel " sounds are normal. There is no distension.      Palpations: Abdomen is soft. There is no mass.      Tenderness: There is no abdominal tenderness. There is no guarding.   Musculoskeletal:         General: Normal range of motion.      Cervical back: Normal range of motion.   Skin:     General: Skin is warm and dry.   Neurological:      General: No focal deficit present.      Mental Status: He is alert and oriented to person, place, and time. Mental status is at baseline.      Cranial Nerves: No cranial nerve deficit.      Motor: No weakness.   Psychiatric:         Mood and Affect: Mood normal.         Behavior: Behavior normal.             Signed Electronically by: Patito Hawley, CNP

## 2025-03-10 ENCOUNTER — OFFICE VISIT (OUTPATIENT)
Dept: DERMATOLOGY | Facility: CLINIC | Age: 35
End: 2025-03-10
Attending: PHYSICIAN ASSISTANT
Payer: COMMERCIAL

## 2025-03-10 DIAGNOSIS — L21.9 DERMATITIS, SEBORRHEIC: ICD-10-CM

## 2025-03-10 DIAGNOSIS — Z79.60 LONG-TERM USE OF IMMUNOSUPPRESSANT MEDICATION: Primary | ICD-10-CM

## 2025-03-10 DIAGNOSIS — D22.9 MULTIPLE BENIGN NEVI: ICD-10-CM

## 2025-03-10 DIAGNOSIS — B36.0 TV (TINEA VERSICOLOR): ICD-10-CM

## 2025-03-10 RX ORDER — KETOCONAZOLE 20 MG/G
CREAM TOPICAL DAILY
Qty: 30 G | Refills: 1 | Status: SHIPPED | OUTPATIENT
Start: 2025-03-10

## 2025-03-10 RX ORDER — KETOCONAZOLE 20 MG/ML
SHAMPOO, SUSPENSION TOPICAL DAILY PRN
Qty: 120 ML | Refills: 11 | Status: SHIPPED | OUTPATIENT
Start: 2025-03-10

## 2025-03-10 NOTE — PROGRESS NOTES
[facility] Dermatology Note  Encounter Date: Mar 10, 2025  Office Visit      Dermatology Problem List:  FBSE: 3/10/25    1. Tinea Versicolor  - Tx: Ketoconazole 2% shampoo.   2. Seb derm - eyelids - keto 2% cream    PMHx: Immunosuppressed - Ulcerative Colitis, dx in 2020. Currently on Humira. 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   ____________________________________________    Assessment & Plan:  # Tinea Versicolor - shoulders chest and upper abdomen  -edu on etiology, reassured  - start ketoconazole 2% shampoo and leave on 3-5min prior to rinsing x 7 days. Repeat if needed in futre    # Seb derm - scalp, eyelids  - Discussed the etiology of this condition as well as treatment and their side effects  - Start on Ketoconazole 2% cream every day x 7 days, then intermittently with flares.   - RTC if improvement does not occur after 4 months of treatment.     # Immunosuppressed 2/2 Humira for UC. Previously on prednisone and Entyvio.   - Continue FBSE every 12 mo  - Discussed role of immune system in surveillance of skin for precancers and early lesions  - Discussed increased risk of skin cancers in patients on chronic immunosuppression due to loss of this normal surveillance  - Counseled regarding photoprotection (SPF30+ broad spectrum sun screen, UPF clothing, broad brimmed hats, avoidance of sunlight during peak hours ~11a-3p)     # Benign findings: multiple benign nevi  - edu on benign etiology  - Signs and Symptoms of non-melanoma skin cancer and ABCDEs of melanoma reviewed with patient. Patient encouraged to perform monthly self skin exams and educated on how to perform them. UV precautions reviewed with patient. Patient was asked about new or changing moles/lesions on body.   - Sunscreen: Apply 20 minutes prior to going outdoors and reapply every two hours, when wet or sweating. We recommend using an SPF 30 or higher, and to use one  that is water resistant.     - RTC for changes     Procedures Performed:   None     Follow-up: 1 year(s) in-person, or earlier for new or changing lesions    Staff and scribe:     Scribe Disclosure:   I, PATRICK RODRIGUEZ, am serving as a scribe; to document services personally performed by Yee Smallwood PA-C -based on data collection and the provider's statements to me.     Provider Disclosure:  I agree with above History, Review of Systems, Physical exam and Plan.  I have reviewed the content of the documentation and have edited it as needed. I have personally performed the services documented here and the documentation accurately represents those services and the decisions I have made.      Electronically signed by:      All risks, benefits and alternatives were discussed with patient.  Patient is in agreement and understands the assessment and plan.  All questions were answered.    Yee Smallwood PA-C, [facility]  [facility]: Phone: [phone], Fax: [phone]  [facility]: Phone: [phone],  Fax: [phone]  [facility]: Phone: [phone], Fax: [phone]  ____________________________________________    CC: Consult ([facility]/On Humira)      Reviewed patients past medical history and pertinent chart review prior to patient's visit today.     HPI:  Mr. Mao Patel is a 34 year old male who presents today as a new patient  for FBSE.  Today patient reported a rash that comes and goes. Has noticed prednisone helps sometimes but also can worsen it.     Hx of Ulcerative Colitis since 2020. Previously on Entyvio and prednisone. Currently on Humira.     No spots of concern. No fhx melanoma.     Patient is otherwise feeling well, without additional concerns.    Labs:  N/A    Physical Exam:  Vitals: There were no vitals taken for this visit.  SKIN: Total skin excluding the undergarment areas was  performed. The exam included the head/face, neck, both arms, chest, back, abdomen, both legs, digits and/or nails.    - - Gary's skin type III, has <100 nevi  - Multiple regular brown pigmented macules and papules are identified on the trunk and extremities.   - faint Erythema and minimal scaling on the eyelids noted.   - faint erythema and scaly patches distributed on the R shoulder the chest and to a lesser degree on the upper abdomen  - No other lesions of concern on areas examined.     Medications:  Current Outpatient Medications   Medication Sig Dispense Refill    adalimumab (HUMIRA) 40 MG/0.8ML prefilled syringe kit Inject 40 mg subcutaneously every 14 days.      pantoprazole (PROTONIX) 40 MG EC tablet Take 40 mg by mouth daily       No current facility-administered medications for this visit.      Past Medical/Surgical History:   Patient Active Problem List   Diagnosis    Varicose veins of both lower extremities, unspecified whether complicated    Other ulcerative colitis without complication (H)     Past Medical History:   Diagnosis Date    NO ACTIVE PROBLEMS

## 2025-03-10 NOTE — PATIENT INSTRUCTIONS
Proper skin care from [facility]:    -Eliminate harsh soaps as they strip the natural oils from the skin, often resulting in dry itchy skin ( i.e. Dial, Zest, [place]an Spring)  -Use mild soaps such as Cetaphil or Dove Sensitive Skin in the shower. You do not need to use soap on arms, legs, and trunk every time you shower unless visibly soiled.   -Avoid hot or cold showers.  -After showering, lightly dry off and apply moisturizing within 2-3 minutes. This will help trap moisture in the skin.   -Aggressive use of a moisturizer at least 1-2 times a day to the entire body (including -Vanicream, Cetaphil, Aquaphor or Cerave) and moisturize hands after every washing.  -We recommend using moisturizers that come in a tub that needs to be scooped out, not a pump. This has more of an oil base. It will hold moisture in your skin much better than a water base moisturizer. The above recommended are non-pore clogging.      Wear a sunscreen with at least SPF 30 on your face, ears, neck and V of the chest daily. Wear sunscreen on other areas of the body if those areas are exposed to the sun throughout the day. Sunscreens can contain physical and/or chemical blockers. Physical blockers are less likely to clog pores, these include zinc oxide and titanium dioxide. Reapply every two hour and after swimming.     Sunscreen examples: https://www.ewg.org/sunscreen/    UV radiation  UVA radiation remains constant throughout the day and throughout the year. It is a longer wavelength than UVB and therefore penetrates deeper into the skin leading to immediate and delayed tanning, photoaging, and skin cancer. 70-80% of UVA and UVB radiation occurs between the hours of 10am-2pm.  UVB radiation  UVB radiation causes the most harmful effects and is more significant during the summer months. However, snow and ice can reflect UVB radiation leading to skin damage during the winter months as well. UVB radiation is responsible for tanning,  burning, inflammation, delayed erythema (pinkness), pigmentation (brown spots), and skin cancer.     I recommend self monthly full body exams and yearly full body exams with a dermatology provider. If you develop a new or changing lesion please follow up for examination. Most skin cancers are pink and scaly or pink and pearly. However, we do see blue/brown/black skin cancers.  Consider the ABCDEs of melanoma when giving yourself your monthly full body exam ( don't forget the groin, buttocks, feet, toes, etc). A-asymmetry, B-borders, C-color, D-diameter, E-elevation or evolving. If you see any of these changes please follow up in clinic. If you cannot see your back I recommend purchasing a hand held mirror to use with a larger wall mirror.       Checking for Skin Cancer  You can find cancer early by checking your skin each month. There are 3 kinds of skin cancer. They are melanoma, basal cell carcinoma, and squamous cell carcinoma. Doing monthly skin checks is the best way to find new marks or skin changes. Follow the instructions below for checking your skin.   The ABCDEs of checking moles for melanoma   Check your moles or growths for signs of melanoma using ABCDE:   Asymmetry: the sides of the mole or growth don t match  Border: the edges are ragged, notched, or blurred  Color: the color within the mole or growth varies  Diameter: the mole or growth is larger than 6 mm (size of a pencil eraser)  Evolving: the size, shape, or color of the mole or growth is changing (evolving is not shown in the images below)    Checking for other types of skin cancer  Basal cell carcinoma or squamous cell carcinoma have symptoms such as:     A spot or mole that looks different from all other marks on your skin  Changes in how an area feels, such as itching, tenderness, or pain  Changes in the skin's surface, such as oozing, bleeding, or scaliness  A sore that does not heal  New swelling or redness beyond the border of a  mole    Who s at risk?  Anyone can get skin cancer. But you are at greater risk if you have:   Fair skin, light-colored hair, or light-colored eyes  Many moles or abnormal moles on your skin  A history of sunburns from sunlight or tanning beds  A family history of skin cancer  A history of exposure to radiation or chemicals  A weakened immune system  If you have had skin cancer in the past, you are at risk for recurring skin cancer.   How to check your skin  Do your monthly skin checkups in front of a full-length mirror. Check all parts of your body, including your:   Head (ears, face, neck, and scalp)  Torso (front, back, and sides)  Arms (tops, undersides, upper, and lower armpits)  Hands (palms, backs, and fingers, including under the nails)  Buttocks and genitals  Legs (front, back, and sides)  Feet (tops, soles, toes, including under the nails, and between toes)  If you have a lot of moles, take digital photos of them each month. Make sure to take photos both up close and from a distance. These can help you see if any moles change over time.   Most skin changes are not cancer. But if you see any changes in your skin, call your doctor right away. Only he or she can diagnose a problem. If you have skin cancer, seeing your doctor can be the first step toward getting the treatment that could save your life.   [employer] last reviewed this educational content on 4/1/2019 2000-2020 [employer]. [address] [place] [address], [place] [address]. All rights reserved. This information is not intended as a substitute for professional medical care. Always follow your healthcare professional's instructions.       When should I call my doctor?  If you are worsening or not improving, please, contact us or seek urgent care as noted below.     Who should I call with questions (adults)?    [facility] [phone]  For urgent needs outside of business hours call the [facility] at  [phone] and ask for the dermatology resident on call to be paged  If this is a medical emergency and you are unable to reach an ER, Call [phone]      If you need a prescription refill, please contact your pharmacy. Refills are approved or denied by our Physicians during normal business hours, Monday through Friday.  Per office policy, refills will not be granted if you have not been seen within the past year (or sooner depending on the condition).

## 2025-03-10 NOTE — LETTER
3/10/2025      Mao Patel  [address] [facility] [id number]      Dear Colleague,    Thank you for referring your patient, Mao Patel, to the [facility]. Please see a copy of my visit note below.    [facility] Dermatology Note  Encounter Date: Mar 10, 2025  Office Visit      Dermatology Problem List:  FBSE: 3/10/25    1. Tinea Versicolor  - Tx: Ketoconazole 2% shampoo.   2. Seb derm - eyelids - keto 2% cream    PMHx: Immunosuppressed - Ulcerative Colitis, dx in 2020. Currently on Humira. 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   ____________________________________________    Assessment & Plan:  # Tinea Versicolor - shoulders chest and upper abdomen  -edu on etiology, reassured  - start ketoconazole 2% shampoo and leave on 3-5min prior to rinsing x 7 days. Repeat if needed in futre    # Seb derm - scalp, eyelids  - Discussed the etiology of this condition as well as treatment and their side effects  - Start on Ketoconazole 2% cream every day x 7 days, then intermittently with flares.   - RTC if improvement does not occur after 4 months of treatment.     # Immunosuppressed 2/2 Humira for UC. Previously on prednisone and Entyvio.   - Continue FBSE every 12 mo  - Discussed role of immune system in surveillance of skin for precancers and early lesions  - Discussed increased risk of skin cancers in patients on chronic immunosuppression due to loss of this normal surveillance  - Counseled regarding photoprotection (SPF30+ broad spectrum sun screen, UPF clothing, broad brimmed hats, avoidance of sunlight during peak hours ~11a-3p)     # Benign findings: multiple benign nevi  - edu on benign etiology  - Signs and Symptoms of non-melanoma skin cancer and ABCDEs of melanoma reviewed with patient. Patient encouraged to perform monthly self skin exams and educated on how to perform them. UV precautions reviewed  with patient. Patient was asked about new or changing moles/lesions on body.   - Sunscreen: Apply 20 minutes prior to going outdoors and reapply every two hours, when wet or sweating. We recommend using an SPF 30 or higher, and to use one that is water resistant.     - RTC for changes     Procedures Performed:   None     Follow-up: 1 year(s) in-person, or earlier for new or changing lesions    Staff and scribe:     Scribe Disclosure:   I, PATRICK RODRIGUEZ, am serving as a scribe; to document services personally performed by Yee Smallwood PA-C -based on data collection and the provider's statements to me.     Provider Disclosure:  I agree with above History, Review of Systems, Physical exam and Plan.  I have reviewed the content of the documentation and have edited it as needed. I have personally performed the services documented here and the documentation accurately represents those services and the decisions I have made.      Electronically signed by:      All risks, benefits and alternatives were discussed with patient.  Patient is in agreement and understands the assessment and plan.  All questions were answered.    Yee Smallwood PA-C, MPAS  [facility] [place]: Phone: [phone], Fax: [phone]  [facility]: Phone: [phone],  Fax: [phone]  [facility]: Phone: [phone], Fax: [phone]  ____________________________________________    CC: Consult ([facility]/On Humira)      Reviewed patients past medical history and pertinent chart review prior to patient's visit today.     HPI:  Mr. Mao Patel is a 34 year old male who presents today as a new patient  for FBSE.  Today patient reported a rash that comes and goes. Has noticed prednisone helps sometimes but also can worsen it.     Hx of Ulcerative Colitis since 2020. Previously on Entyvio and prednisone. Currently on Humira.     No spots of  concern. No fhx melanoma.     Patient is otherwise feeling well, without additional concerns.    Labs:  N/A    Physical Exam:  Vitals: There were no vitals taken for this visit.  SKIN: Total skin excluding the undergarment areas was performed. The exam included the head/face, neck, both arms, chest, back, abdomen, both legs, digits and/or nails.    - - Gary's skin type III, has <100 nevi  - Multiple regular brown pigmented macules and papules are identified on the trunk and extremities.   - faint Erythema and minimal scaling on the eyelids noted.   - faint erythema and scaly patches distributed on the R shoulder the chest and to a lesser degree on the upper abdomen  - No other lesions of concern on areas examined.     Medications:  Current Outpatient Medications   Medication Sig Dispense Refill     adalimumab (HUMIRA) 40 MG/0.8ML prefilled syringe kit Inject 40 mg subcutaneously every 14 days.       pantoprazole (PROTONIX) 40 MG EC tablet Take 40 mg by mouth daily       No current facility-administered medications for this visit.      Past Medical/Surgical History:   Patient Active Problem List   Diagnosis     Varicose veins of both lower extremities, unspecified whether complicated     Other ulcerative colitis without complication (H)     Past Medical History:   Diagnosis Date     NO ACTIVE PROBLEMS                         Again, thank you for allowing me to participate in the care of your patient.        Sincerely,        Yee Smallwood PA-C    Electronically signed

## 2025-03-20 LAB — CV ZIO PRELIM RESULTS: NORMAL

## 2025-03-24 ENCOUNTER — TRANSFERRED RECORDS (OUTPATIENT)
Dept: HEALTH INFORMATION MANAGEMENT | Facility: CLINIC | Age: 35
End: 2025-03-24
Payer: COMMERCIAL

## (undated) DEVICE — KIT ENDO TURNOVER/PROCEDURE W/CLEAN A SCOPE LINERS 103888

## (undated) RX ORDER — FENTANYL CITRATE 50 UG/ML
INJECTION, SOLUTION INTRAMUSCULAR; INTRAVENOUS
Status: DISPENSED
Start: 2019-06-14